# Patient Record
Sex: MALE | Race: BLACK OR AFRICAN AMERICAN | Employment: FULL TIME | URBAN - METROPOLITAN AREA
[De-identification: names, ages, dates, MRNs, and addresses within clinical notes are randomized per-mention and may not be internally consistent; named-entity substitution may affect disease eponyms.]

---

## 2019-06-09 ENCOUNTER — HOSPITAL ENCOUNTER (EMERGENCY)
Age: 39
Discharge: HOME | End: 2019-06-09
Attending: EMERGENCY MEDICINE

## 2019-06-09 VITALS
SYSTOLIC BLOOD PRESSURE: 120 MMHG | RESPIRATION RATE: 18 BRPM | BODY MASS INDEX: 23.52 KG/M2 | HEART RATE: 64 BPM | HEIGHT: 71 IN | TEMPERATURE: 98.4 F | WEIGHT: 168 LBS | DIASTOLIC BLOOD PRESSURE: 75 MMHG | OXYGEN SATURATION: 98 %

## 2019-06-09 DIAGNOSIS — H20.9 IRITIS: Primary | ICD-10-CM

## 2019-06-09 PROCEDURE — 6370000000 HC RX 637 (ALT 250 FOR IP): Performed by: EMERGENCY MEDICINE

## 2019-06-09 PROCEDURE — 99282 EMERGENCY DEPT VISIT SF MDM: CPT

## 2019-06-09 PROCEDURE — 6370000000 HC RX 637 (ALT 250 FOR IP): Performed by: PHYSICIAN ASSISTANT

## 2019-06-09 RX ORDER — ATROPINE SULFATE 10 MG/ML
1 SOLUTION/ DROPS OPHTHALMIC ONCE
Status: COMPLETED | OUTPATIENT
Start: 2019-06-09 | End: 2019-06-09

## 2019-06-09 RX ORDER — ERYTHROMYCIN 5 MG/G
OINTMENT OPHTHALMIC
Qty: 1 TUBE | Refills: 0 | Status: SHIPPED | OUTPATIENT
Start: 2019-06-09 | End: 2019-06-19

## 2019-06-09 RX ORDER — HOMATROPINE HYDROBROMIDE OPHTHALMIC 50 MG/ML
1 SOLUTION OPHTHALMIC ONCE
Status: DISCONTINUED | OUTPATIENT
Start: 2019-06-09 | End: 2019-06-09

## 2019-06-09 RX ORDER — PROPARACAINE HYDROCHLORIDE 5 MG/ML
1 SOLUTION/ DROPS OPHTHALMIC ONCE
Status: COMPLETED | OUTPATIENT
Start: 2019-06-09 | End: 2019-06-09

## 2019-06-09 RX ADMIN — PROPARACAINE HYDROCHLORIDE 1 DROP: 5 SOLUTION/ DROPS OPHTHALMIC at 18:42

## 2019-06-09 RX ADMIN — ATROPINE SULFATE 1 DROP: 10 SOLUTION/ DROPS OPHTHALMIC at 19:46

## 2019-06-09 RX ADMIN — FLUORESCEIN SODIUM 1 MG: 1 STRIP OPHTHALMIC at 18:42

## 2019-06-09 ASSESSMENT — ENCOUNTER SYMPTOMS
SHORTNESS OF BREATH: 0
EYE ITCHING: 0
NAUSEA: 0
COLOR CHANGE: 0
SORE THROAT: 0
EYE DISCHARGE: 1
VOMITING: 0
ABDOMINAL PAIN: 0
DIARRHEA: 0
COUGH: 0
BACK PAIN: 0
PHOTOPHOBIA: 1
EYE REDNESS: 1
EYE PAIN: 1

## 2019-06-09 ASSESSMENT — PAIN DESCRIPTION - LOCATION: LOCATION: EYE

## 2019-06-09 ASSESSMENT — VISUAL ACUITY
OS: 20/200
OD: 20/70
OU: 20/70

## 2019-06-09 ASSESSMENT — PAIN DESCRIPTION - FREQUENCY: FREQUENCY: CONTINUOUS

## 2019-06-09 ASSESSMENT — PAIN DESCRIPTION - DESCRIPTORS: DESCRIPTORS: BURNING;ACHING

## 2019-06-09 ASSESSMENT — PAIN DESCRIPTION - ORIENTATION: ORIENTATION: LEFT

## 2019-06-09 ASSESSMENT — PAIN DESCRIPTION - PAIN TYPE: TYPE: ACUTE PAIN

## 2019-06-09 ASSESSMENT — PAIN SCALES - GENERAL: PAINLEVEL_OUTOF10: 10

## 2019-06-09 NOTE — ED TRIAGE NOTES
\"Accidentally poked in the eye yesterday by my cousin, my left eye in hurting real bad, light affects it, it is red with continuous drainage, worst pain ever\"  Rtaed bursning and aching pain 10/10.

## 2019-06-09 NOTE — ED PROVIDER NOTES
[facility]  ED  eMERGENCY dEPARTMENT eNCOUnter      Pt Name: Mariano Chang  MRN: [id number]  [place] 1980  Date of evaluation: 6/9/2019  Provider: Angeline Mcnulty PA-C    CHIEF COMPLAINT       Chief Complaint   Patient presents with    Eye Injury     Eye redness RT injury     Photophobia             HISTORY OF PRESENT ILLNESS  (Location/Symptom, Timing/Onset, Context/Setting, Quality, Duration, Modifying Factors, Severity.)   Mariano Chang is a 42 y.o. male who presents to the emergencydepartment complaining of left eye redness and pain starting yesterday after he was playing with his cousin and he actually hit him in the left eye. He did have his contacts in and slept and then the night before so this could potentially contribute to his irritation. He denies any chest pain or shortness of breath. No fever or chills. No abdominal pain. He has no history of glaucoma or no family history of glaucoma. No other symptoms. REVIEW OF SYSTEMS    (2-9 systems for level 4, 10 ormore for level 5)     Review of Systems   Constitutional: Negative for chills, fatigue and fever. HENT: Negative for ear pain and sore throat. Eyes: Positive for photophobia, pain, discharge and redness. Negative for itching and visual disturbance. Respiratory: Negative for cough and shortness of breath. Cardiovascular: Negative for chest pain, palpitations and leg swelling. Gastrointestinal: Negative for abdominal pain, diarrhea, nausea and vomiting. Genitourinary: Negative for flank pain. Musculoskeletal: Negative for back pain, neck pain and neck stiffness. Skin: Negative for color change. Neurological: Negative for dizziness, tremors, seizures, syncope, facial asymmetry, speech difficulty, weakness, light-headedness, numbness and headaches. PAST MEDICAL HISTORY   History reviewed. No pertinent past medical history. Reviewed and not pertinent to today's chief complaint.   SURGICAL HISTORY days. He should return for any worsening symptoms. Visual acuity is normal. No diplopia. We will give him erythromycin ointment to go home with to take as prescribed and as directed and all the way through to completion. Patient understood and will comply. Patient is satisfied. All questions answered. Attending physician, myself, and patient agree no further workup is necessary at this time. Patient was given very strict return protocols and is completely agreeable with this plan. This patient was seen by the attending [phone] they agreed with the assessment and plan. CONSULTS:  None    PROCEDURES:  Procedures    FINAL IMPRESSION      1.  Iritis          DISPOSITION/PLAN   DISPOSITION Decision To Discharge 06/09/2019 07:21:41 PM      PATIENT REFERRED TO:  [facility]  [address] [id number] [phone]  Go to   As needed, If symptoms worsen    Dee Dee Salter MD  [address] [place] [address]  [phone]    Schedule an appointment as soon as possible for a visit in 3 days  For Re-check      DISCHARGE MEDICATIONS:  Discharge Medication List as of 6/9/2019  7:25 PM      START taking these medications    Details   erythromycin (ROMYCIN) 5 MG/GM ophthalmic ointment Apply to affected eye 3 times a day for 5-7 days until symptoms clear., Disp-1 Tube, R-0, Print             (Please note that portions of this note were completed with a voice recognition program.  Efforts were made to edit the dictations but occasionally words are mis-transcribed.)    [address] [facility], PAJaimeC       Izzy Keen PA-C  06/09/19 4814

## 2019-06-09 NOTE — LETTER
[facility]  [address] [facility] [address] [facility] [address]  Phone: [phone]             June 12, 2019    Patient: Raul Dandy   YOB: 1980   Date of Visit: 6/9/2019       To Whom It May Concern:    Dina Charlotte was seen and treated in our emergency department on 6/9/2019. Luisa Never       Sincerely,             Signature:__________________________________

## 2019-06-09 NOTE — ED NOTES
Visual acuity obtained, pt not wearing his normal eye glasses or contacts. Anjum CARRASCO notified.       Phillip Warren RN  06/09/19 1946

## 2021-01-19 ENCOUNTER — HOSPITAL ENCOUNTER (INPATIENT)
Age: 41
LOS: 2 days | Discharge: HOME | DRG: 605 | End: 2021-01-21
Attending: EMERGENCY MEDICINE | Admitting: INTERNAL MEDICINE

## 2021-01-19 DIAGNOSIS — S11.90XA OPEN WOUND OF NECK, INITIAL ENCOUNTER: Primary | ICD-10-CM

## 2021-01-19 PROBLEM — T14.8XXA OPEN WOUND OF SKIN: Status: ACTIVE | Noted: 2021-01-19

## 2021-01-19 LAB
AMPHETAMINE SCREEN URINE: POSITIVE
ANION GAP SERPL CALCULATED.3IONS-SCNC: 5 MMOL/L (ref 9–17)
BARBITURATE SCREEN URINE: NEGATIVE
BENZODIAZEPINE SCREEN, URINE: NEGATIVE
BILIRUBIN URINE: NEGATIVE
BUN BLDV-MCNC: 11 MG/DL (ref 6–20)
BUN/CREAT BLD: ABNORMAL (ref 9–20)
BUPRENORPHINE URINE: ABNORMAL
CALCIUM SERPL-MCNC: 8.5 MG/DL (ref 8.6–10.4)
CANNABINOID SCREEN URINE: POSITIVE
CHLORIDE BLD-SCNC: 113 MMOL/L (ref 98–107)
CO2: 22 MMOL/L (ref 20–31)
COCAINE METABOLITE, URINE: NEGATIVE
COLOR: YELLOW
COMMENT UA: NORMAL
CREAT SERPL-MCNC: 0.87 MG/DL (ref 0.7–1.2)
GFR AFRICAN AMERICAN: >60 ML/MIN
GFR NON-AFRICAN AMERICAN: >60 ML/MIN
GFR SERPL CREATININE-BSD FRML MDRD: ABNORMAL ML/MIN/{1.73_M2}
GFR SERPL CREATININE-BSD FRML MDRD: ABNORMAL ML/MIN/{1.73_M2}
GLUCOSE BLD-MCNC: 92 MG/DL (ref 70–99)
GLUCOSE URINE: NEGATIVE
HCT VFR BLD CALC: 42.9 % (ref 40.7–50.3)
HEMOGLOBIN: 13.9 G/DL (ref 13–17)
KETONES, URINE: NEGATIVE
LEUKOCYTE ESTERASE, URINE: NEGATIVE
MCH RBC QN AUTO: 30.3 PG (ref 25.2–33.5)
MCHC RBC AUTO-ENTMCNC: 32.4 G/DL (ref 28.4–34.8)
MCV RBC AUTO: 93.7 FL (ref 82.6–102.9)
MDMA URINE: ABNORMAL
METHADONE SCREEN, URINE: NEGATIVE
METHAMPHETAMINE, URINE: ABNORMAL
NITRITE, URINE: NEGATIVE
NRBC AUTOMATED: 0 PER 100 WBC
OPIATES, URINE: NEGATIVE
OXYCODONE SCREEN URINE: NEGATIVE
PDW BLD-RTO: 13.8 % (ref 11.8–14.4)
PH UA: 7 (ref 5–8)
PHENCYCLIDINE, URINE: NEGATIVE
PLATELET # BLD: 213 K/UL (ref 138–453)
PMV BLD AUTO: 10.6 FL (ref 8.1–13.5)
POTASSIUM SERPL-SCNC: 4.5 MMOL/L (ref 3.7–5.3)
PROPOXYPHENE, URINE: ABNORMAL
PROTEIN UA: NEGATIVE
RBC # BLD: 4.58 M/UL (ref 4.21–5.77)
SODIUM BLD-SCNC: 140 MMOL/L (ref 135–144)
SPECIFIC GRAVITY UA: 1.03 (ref 1–1.03)
TEST INFORMATION: ABNORMAL
TRICYCLIC ANTIDEPRESSANTS, UR: ABNORMAL
TURBIDITY: CLEAR
URINE HGB: NEGATIVE
UROBILINOGEN, URINE: NORMAL
WBC # BLD: 6.5 K/UL (ref 3.5–11.3)

## 2021-01-19 PROCEDURE — 1200000000 HC SEMI PRIVATE

## 2021-01-19 PROCEDURE — 81003 URINALYSIS AUTO W/O SCOPE: CPT

## 2021-01-19 PROCEDURE — 87070 CULTURE OTHR SPECIMN AEROBIC: CPT

## 2021-01-19 PROCEDURE — 99285 EMERGENCY DEPT VISIT HI MDM: CPT

## 2021-01-19 PROCEDURE — 6370000000 HC RX 637 (ALT 250 FOR IP): Performed by: GENERAL PRACTICE

## 2021-01-19 PROCEDURE — 86403 PARTICLE AGGLUT ANTBDY SCRN: CPT

## 2021-01-19 PROCEDURE — 85027 COMPLETE CBC AUTOMATED: CPT

## 2021-01-19 PROCEDURE — 80048 BASIC METABOLIC PNL TOTAL CA: CPT

## 2021-01-19 PROCEDURE — 87075 CULTR BACTERIA EXCEPT BLOOD: CPT

## 2021-01-19 PROCEDURE — 80307 DRUG TEST PRSMV CHEM ANLYZR: CPT

## 2021-01-19 PROCEDURE — 87205 SMEAR GRAM STAIN: CPT

## 2021-01-19 RX ORDER — DOXYCYCLINE HYCLATE 100 MG
100 TABLET ORAL EVERY 12 HOURS SCHEDULED
Status: DISCONTINUED | OUTPATIENT
Start: 2021-01-19 | End: 2021-01-21 | Stop reason: HOSPADM

## 2021-01-19 RX ADMIN — DOXYCYCLINE HYCLATE 100 MG: 100 TABLET, COATED ORAL at 21:43

## 2021-01-19 ASSESSMENT — ENCOUNTER SYMPTOMS
COUGH: 0
SORE THROAT: 0
FACIAL SWELLING: 1
SHORTNESS OF BREATH: 0
TROUBLE SWALLOWING: 1
VOMITING: 0
NAUSEA: 0
VOICE CHANGE: 0

## 2021-01-19 ASSESSMENT — PAIN DESCRIPTION - LOCATION: LOCATION: JAW

## 2021-01-19 ASSESSMENT — PAIN SCALES - GENERAL: PAINLEVEL_OUTOF10: 8

## 2021-01-19 ASSESSMENT — PAIN DESCRIPTION - ORIENTATION: ORIENTATION: LEFT

## 2021-01-19 NOTE — ED NOTES
Pt arrived to ED alert and oriented x4 via [facility]. Pt is a transfer from [facility] for infection to left jaw. Pt reports that he had an ingrown hair and states that he was unable to get it out, states that since Friday he has been digging in the wound with his fingers and tweezers. Pt reports that he noticed worms coming from it and states that he has felt more weak and fatigued than usual.  Pt c/o left jaw pain, states that he has not been able to eat d/t difficulty of eating and drinking from wound. Pt denies having been around anyone suspected to have COVID-19 or anyone that has been sick, denies recent travel outside the [facility] or [address]. Pt placed on continuous pulse ox and BP cuff. Dr. Leida Verdugo at bedside to assess pt. RR even and unlabored. NAD noted. Will continue with plan of care.      David Mixon RN  01/19/21 8149

## 2021-01-19 NOTE — ED PROVIDER NOTES
[facility]     Emergency Department     Faculty Attestation    I performed a history and physical examination of the patient and discussed management with the resident. I reviewed the residents note and agree with the documented findings and plan of care. Any areas of disagreement are noted on the chart. I was personally present for the key portions of any procedures. I have documented in the chart those procedures where I was not present during the key portions. I have reviewed the emergency nurses triage note. I agree with the chief complaint, past medical history, past surgical history, allergies, medications, social and family history as documented unless otherwise noted below. For Physician Assistant/ Nurse Practitioner cases/documentation I have personally evaluated this patient and have completed at least one if not all key elements of the E/M (history, physical exam, and MDM). Additional findings are as noted. I have personally seen and evaluated the patient. I find the patient's history and physical exam are consistent with the NP/PA documentation. I agree with the care provided, treatment rendered, disposition and follow-up plan. For evaluation of large ulcerative lesion self-induced over the last several days. Critical Care     Tj Robbins M.D.   Attending Emergency  Physician              Devang Corbett MD  01/19/21 4570

## 2021-01-19 NOTE — ED PROVIDER NOTES
[address] [facility]  ED  Emergency Department Encounter  EmergencyMedicine Resident     Pt Diego Batista  MRN: [id number]  [place] 1980  Date of evaluation: 1/19/21  PCP:  No primary care provider on file. CHIEF COMPLAINT       Chief Complaint   Patient presents with    Jaw Pain     Left jaw pain, has been digging in left jaw area since Friday    Wound Infection       HISTORY OF PRESENT ILLNESS  (Location/Symptom, Timing/Onset, Context/Setting, Quality, Duration, Modifying Factors, Severity.)      Maximo Beck is a 47 y.o. male who presents as a transfer from [facility] for \"worms under his skin\" patient states that he has a history of ingrown hairs on his face, and normally just picks them out with a tweezer. Patient states that on Friday he had the same feeling however when he tried to pull the hair out he and his girlfriend were convinced that it was a worm and they continued to gag since Friday into his neck developing a large open area in his neck. Emergency department at [facility] discussed with on-call plastic surgeon Dr. Alexa Torres requested that patient be transferred to [facility] for admission to the medicine team infectious disease consult. Patient states that he did have drainage from the wound, but denies any immediate expulsion or large purulent drainage on Friday, states that there is been constant intermittent drainage from the wound. Patient states he has had some mild dysphagia patient denies any significant past medical history, is not on any medications, denies any medical allergies. Denies any history of depression or psychiatric diagnosis. PAST MEDICAL / SURGICAL / SOCIAL / FAMILY HISTORY      has no past medical history on file. None     has no past surgical history on file.   None    Social History     Socioeconomic History    Marital status: Single     Spouse name: Not on file    Number of children: Not on file    Years of education: Not on file    Highest education level: Not on file   Occupational History    Not on file   Social Needs    Financial resource strain: Not on file    Food insecurity     Worry: Not on file     Inability: Not on file    Transportation needs     Medical: Not on file     Non-medical: Not on file   Tobacco Use    Smoking status: Current Every Day Smoker     Packs/day: 0.50    Smokeless tobacco: Never Used   Substance and Sexual Activity    Alcohol use: Yes     Comment: Socially    Drug use: Never    Sexual activity: Not on file   Lifestyle    Physical activity     Days per week: Not on file     Minutes per session: Not on file    Stress: Not on file   Relationships    Social connections     Talks on phone: Not on file     Gets together: Not on file     Attends Yazidism service: Not on file     Active member of club or organization: Not on file     Attends meetings of clubs or organizations: Not on file     Relationship status: Not on file    Intimate partner violence     Fear of current or ex partner: Not on file     Emotionally abused: Not on file     Physically abused: Not on file     Forced sexual activity: Not on file   Other Topics Concern    Not on file   Social History Narrative    Not on file       History reviewed. No pertinent family history. Allergies:  Patient has no known allergies. Home Medications:  Prior to Admission medications    Not on File       REVIEW OF SYSTEMS    (2-9 systems for level 4, 10 or more for level 5)      Review of Systems   Constitutional: Negative for activity change, appetite change, chills and fever. HENT: Positive for facial swelling and trouble swallowing. Negative for congestion, sore throat and voice change. Respiratory: Negative for cough and shortness of breath. Cardiovascular: Negative for chest pain. Gastrointestinal: Negative for nausea and vomiting. Endocrine: Negative for cold intolerance and heat intolerance. Musculoskeletal: Positive for neck pain.    Skin: Positive for wound. Neurological: Negative for light-headedness and headaches. Psychiatric/Behavioral: Negative for agitation, behavioral problems, confusion and self-injury. The patient is not nervous/anxious. PHYSICAL EXAM   (up to 7 for level 4, 8 or more for level 5)      INITIAL VITALS:   BP (!) 144/88   Pulse 61   Temp 99.3 °F (37.4 °C) (Oral)   Resp 16   Ht 5' 11\" (1.803 m)   Wt 170 lb (77.1 kg)   SpO2 99%   BMI 23.71 kg/m²     Physical Exam  Constitutional:       General: He is not in acute distress. Appearance: Normal appearance. He is not ill-appearing, toxic-appearing or diaphoretic. HENT:      Head: Normocephalic and atraumatic. Mouth/Throat:      Mouth: Mucous membranes are moist.      Pharynx: No oropharyngeal exudate or posterior oropharyngeal erythema. Eyes:      General:         Right eye: No discharge. Left eye: No discharge. Pupils: Pupils are equal, round, and reactive to light. Neck:      Comments: Open wound and left jawline and to submandibular area and neck with large pocket noticeable. No active bleeding or purulent drainage noted  Cardiovascular:      Rate and Rhythm: Normal rate. Pulmonary:      Comments: Breathing comfortable in room air, symmetric chest rise, speaking full sentences no evidence of respiratory distress with audible stridor  Neurological:      Mental Status: He is alert. Psychiatric:         Mood and Affect: Mood normal.         Behavior: Behavior normal.         Thought Content:  Thought content normal.         Judgment: Judgment normal.      Comments: Patient is calm, answers questions appropriately, normal affect             DIFFERENTIAL  DIAGNOSIS     PLAN (LABS / IMAGING / EKG):  Orders Placed This Encounter   Procedures    Culture, Anaerobic and Aerobic    CBC    BASIC METABOLIC PANEL    Urinalysis    Urine Drug Screen    Inpatient consult to Plastic Surgery    Inpatient consult to Internal Medicine    Inpatient consult to Infectious Diseases    PATIENT STATUS (FROM ED OR OR/PROCEDURAL) Inpatient       MEDICATIONS ORDERED:  Orders Placed This Encounter   Medications    doxycycline hyclate (VIBRA-TABS) tablet 100 mg     Order Specific Question:   Antimicrobial Indications     Answer:   Skin and Soft Tissue Infection       DDX: Abscess, sebaceous cyst, folliculitis, furuncle, carbuncle, formication, bipolar, wu episode, schizophrenia    DIAGNOSTIC RESULTS / EMERGENCY DEPARTMENT COURSE / MDM   :  Results for orders placed or performed during the hospital encounter of 01/19/21   CBC   Result Value Ref Range    WBC 6.5 3.5 - 11.3 k/uL    RBC 4.58 4.21 - 5.77 m/uL    Hemoglobin 13.9 13.0 - 17.0 g/dL    Hematocrit 42.9 40.7 - 50.3 %    MCV 93.7 82.6 - 102.9 fL    MCH 30.3 25.2 - 33.5 pg    MCHC 32.4 28.4 - 34.8 g/dL    RDW 13.8 11.8 - 14.4 %    Platelets 240 597 - 423 k/uL    MPV 10.6 8.1 - 13.5 fL    NRBC Automated 0.0 0.0 per 100 WBC   BASIC METABOLIC PANEL   Result Value Ref Range    Glucose 92 70 - 99 mg/dL    BUN 11 6 - 20 mg/dL    CREATININE 0.87 0.70 - 1.20 mg/dL    Bun/Cre Ratio NOT REPORTED 9 - 20    Calcium 8.5 (L) 8.6 - 10.4 mg/dL    Sodium 140 135 - 144 mmol/L    Potassium 4.5 3.7 - 5.3 mmol/L    Chloride 113 (H) 98 - 107 mmol/L    CO2 22 20 - 31 mmol/L    Anion Gap 5 (L) 9 - 17 mmol/L    GFR Non-African American >60 >60 mL/min    GFR African American >60 >60 mL/min    GFR Comment          GFR Staging NOT REPORTED              RADIOLOGY:  None    EKG  None    All EKG's are interpreted by the Emergency Department Physician who either signs or Co-signs this chart in the absence of a cardiologist.    EMERGENCY DEPARTMENT COURSE/IMPRESSION: 24-year-old male transferred from outside hospital for open wound to left side of neck after having feeling that worms are in his neck and tried to take them out with tweezers.   Upon arrival to the emergency department patient denies any pain, is alert and oriented x3 is

## 2021-01-19 NOTE — ED NOTES
Bed: 27  Expected date: 1/19/21  Expected time: 6:11 PM  Means of arrival:   Comments:  [facility]   Patient was digging at his face/neck area overlying his left mandible, with a concern for worms, he has multiple open wounds with obvious infection. Dr. Adrianna Watson on-call for facial surgery/plastics, accepted patient and recommend admission to medical team, IV antibiotics, ID consultation.      Nadia Acuña RN  01/19/21 [id number]

## 2021-01-20 ENCOUNTER — APPOINTMENT (OUTPATIENT)
Dept: CT IMAGING | Age: 41
DRG: 605 | End: 2021-01-20

## 2021-01-20 LAB
ABSOLUTE EOS #: 0.17 K/UL (ref 0–0.44)
ABSOLUTE IMMATURE GRANULOCYTE: <0.03 K/UL (ref 0–0.3)
ABSOLUTE LYMPH #: 2.5 K/UL (ref 1.1–3.7)
ABSOLUTE MONO #: 0.69 K/UL (ref 0.1–1.2)
ANION GAP SERPL CALCULATED.3IONS-SCNC: 9 MMOL/L (ref 9–17)
BASOPHILS # BLD: 1 % (ref 0–2)
BASOPHILS ABSOLUTE: 0.05 K/UL (ref 0–0.2)
BUN BLDV-MCNC: 10 MG/DL (ref 6–20)
BUN/CREAT BLD: ABNORMAL (ref 9–20)
C-REACTIVE PROTEIN: 13.7 MG/L (ref 0–5)
CALCIUM SERPL-MCNC: 8.7 MG/DL (ref 8.6–10.4)
CHLORIDE BLD-SCNC: 110 MMOL/L (ref 98–107)
CO2: 22 MMOL/L (ref 20–31)
CREAT SERPL-MCNC: 0.94 MG/DL (ref 0.7–1.2)
DIFFERENTIAL TYPE: NORMAL
EOSINOPHILS RELATIVE PERCENT: 3 % (ref 1–4)
ESTIMATED AVERAGE GLUCOSE: 94 MG/DL
GFR AFRICAN AMERICAN: >60 ML/MIN
GFR NON-AFRICAN AMERICAN: >60 ML/MIN
GFR SERPL CREATININE-BSD FRML MDRD: ABNORMAL ML/MIN/{1.73_M2}
GFR SERPL CREATININE-BSD FRML MDRD: ABNORMAL ML/MIN/{1.73_M2}
GLUCOSE BLD-MCNC: 95 MG/DL (ref 70–99)
HBA1C MFR BLD: 4.9 % (ref 4–6)
HCT VFR BLD CALC: 45.1 % (ref 40.7–50.3)
HEMOGLOBIN: 14.8 G/DL (ref 13–17)
HIV AG/AB: NONREACTIVE
IMMATURE GRANULOCYTES: 0 %
LYMPHOCYTES # BLD: 43 % (ref 24–43)
MCH RBC QN AUTO: 30.5 PG (ref 25.2–33.5)
MCHC RBC AUTO-ENTMCNC: 32.8 G/DL (ref 28.4–34.8)
MCV RBC AUTO: 92.8 FL (ref 82.6–102.9)
MONOCYTES # BLD: 12 % (ref 3–12)
NRBC AUTOMATED: 0 PER 100 WBC
PDW BLD-RTO: 13.5 % (ref 11.8–14.4)
PLATELET # BLD: NORMAL K/UL (ref 138–453)
PLATELET ESTIMATE: NORMAL
PLATELET, FLUORESCENCE: NORMAL K/UL (ref 138–453)
PLATELET, IMMATURE FRACTION: NORMAL % (ref 1.1–10.3)
PMV BLD AUTO: NORMAL FL (ref 8.1–13.5)
POTASSIUM SERPL-SCNC: 4.6 MMOL/L (ref 3.7–5.3)
RBC # BLD: 4.86 M/UL (ref 4.21–5.77)
RBC # BLD: NORMAL 10*6/UL
SEG NEUTROPHILS: 41 % (ref 36–65)
SEGMENTED NEUTROPHILS ABSOLUTE COUNT: 2.42 K/UL (ref 1.5–8.1)
SODIUM BLD-SCNC: 141 MMOL/L (ref 135–144)
THYROXINE, FREE: 1.35 NG/DL (ref 0.93–1.7)
TSH SERPL DL<=0.05 MIU/L-ACNC: 1.9 MIU/L (ref 0.3–5)
WBC # BLD: 5.9 K/UL (ref 3.5–11.3)
WBC # BLD: NORMAL 10*3/UL

## 2021-01-20 PROCEDURE — 1200000000 HC SEMI PRIVATE

## 2021-01-20 PROCEDURE — 86140 C-REACTIVE PROTEIN: CPT

## 2021-01-20 PROCEDURE — 88305 TISSUE EXAM BY PATHOLOGIST: CPT

## 2021-01-20 PROCEDURE — 94760 N-INVAS EAR/PLS OXIMETRY 1: CPT

## 2021-01-20 PROCEDURE — 6370000000 HC RX 637 (ALT 250 FOR IP): Performed by: STUDENT IN AN ORGANIZED HEALTH CARE EDUCATION/TRAINING PROGRAM

## 2021-01-20 PROCEDURE — 2580000003 HC RX 258: Performed by: STUDENT IN AN ORGANIZED HEALTH CARE EDUCATION/TRAINING PROGRAM

## 2021-01-20 PROCEDURE — 99254 IP/OBS CNSLTJ NEW/EST MOD 60: CPT | Performed by: INTERNAL MEDICINE

## 2021-01-20 PROCEDURE — 83036 HEMOGLOBIN GLYCOSYLATED A1C: CPT

## 2021-01-20 PROCEDURE — 85055 RETICULATED PLATELET ASSAY: CPT

## 2021-01-20 PROCEDURE — 87389 HIV-1 AG W/HIV-1&-2 AB AG IA: CPT

## 2021-01-20 PROCEDURE — 0HB1XZX EXCISION OF FACE SKIN, EXTERNAL APPROACH, DIAGNOSTIC: ICD-10-PCS | Performed by: PLASTIC SURGERY

## 2021-01-20 PROCEDURE — 85025 COMPLETE CBC W/AUTO DIFF WBC: CPT

## 2021-01-20 PROCEDURE — 70491 CT SOFT TISSUE NECK W/DYE: CPT

## 2021-01-20 PROCEDURE — 80048 BASIC METABOLIC PNL TOTAL CA: CPT

## 2021-01-20 PROCEDURE — 6360000004 HC RX CONTRAST MEDICATION: Performed by: PLASTIC SURGERY

## 2021-01-20 PROCEDURE — 84443 ASSAY THYROID STIM HORMONE: CPT

## 2021-01-20 PROCEDURE — 84439 ASSAY OF FREE THYROXINE: CPT

## 2021-01-20 PROCEDURE — 36415 COLL VENOUS BLD VENIPUNCTURE: CPT

## 2021-01-20 PROCEDURE — 99222 1ST HOSP IP/OBS MODERATE 55: CPT | Performed by: INTERNAL MEDICINE

## 2021-01-20 RX ORDER — POLYETHYLENE GLYCOL 3350 17 G/17G
17 POWDER, FOR SOLUTION ORAL DAILY PRN
Status: DISCONTINUED | OUTPATIENT
Start: 2021-01-20 | End: 2021-01-21 | Stop reason: HOSPADM

## 2021-01-20 RX ORDER — ACETAMINOPHEN 650 MG/1
650 SUPPOSITORY RECTAL EVERY 6 HOURS PRN
Status: DISCONTINUED | OUTPATIENT
Start: 2021-01-20 | End: 2021-01-21 | Stop reason: HOSPADM

## 2021-01-20 RX ORDER — LANOLIN ALCOHOL/MO/W.PET/CERES
3 CREAM (GRAM) TOPICAL NIGHTLY PRN
Status: DISCONTINUED | OUTPATIENT
Start: 2021-01-19 | End: 2021-01-21 | Stop reason: HOSPADM

## 2021-01-20 RX ORDER — OXYCODONE HYDROCHLORIDE AND ACETAMINOPHEN 5; 325 MG/1; MG/1
1 TABLET ORAL EVERY 12 HOURS
Status: COMPLETED | OUTPATIENT
Start: 2021-01-20 | End: 2021-01-21

## 2021-01-20 RX ORDER — IBUPROFEN 400 MG/1
400 TABLET ORAL EVERY 6 HOURS PRN
Status: DISCONTINUED | OUTPATIENT
Start: 2021-01-20 | End: 2021-01-21 | Stop reason: HOSPADM

## 2021-01-20 RX ORDER — SODIUM CHLORIDE 0.9 % (FLUSH) 0.9 %
10 SYRINGE (ML) INJECTION PRN
Status: DISCONTINUED | OUTPATIENT
Start: 2021-01-20 | End: 2021-01-21 | Stop reason: HOSPADM

## 2021-01-20 RX ORDER — ACETAMINOPHEN 325 MG/1
650 TABLET ORAL EVERY 6 HOURS PRN
Status: DISCONTINUED | OUTPATIENT
Start: 2021-01-20 | End: 2021-01-21 | Stop reason: HOSPADM

## 2021-01-20 RX ORDER — SODIUM CHLORIDE 0.9 % (FLUSH) 0.9 %
10 SYRINGE (ML) INJECTION EVERY 12 HOURS SCHEDULED
Status: DISCONTINUED | OUTPATIENT
Start: 2021-01-20 | End: 2021-01-21 | Stop reason: HOSPADM

## 2021-01-20 RX ORDER — AMOXICILLIN AND CLAVULANATE POTASSIUM 875; 125 MG/1; MG/1
1 TABLET, FILM COATED ORAL EVERY 12 HOURS SCHEDULED
Status: DISCONTINUED | OUTPATIENT
Start: 2021-01-20 | End: 2021-01-21 | Stop reason: HOSPADM

## 2021-01-20 RX ORDER — ONDANSETRON 2 MG/ML
4 INJECTION INTRAMUSCULAR; INTRAVENOUS EVERY 6 HOURS PRN
Status: DISCONTINUED | OUTPATIENT
Start: 2021-01-20 | End: 2021-01-21 | Stop reason: HOSPADM

## 2021-01-20 RX ORDER — PROMETHAZINE HYDROCHLORIDE 12.5 MG/1
12.5 TABLET ORAL EVERY 6 HOURS PRN
Status: DISCONTINUED | OUTPATIENT
Start: 2021-01-20 | End: 2021-01-21 | Stop reason: HOSPADM

## 2021-01-20 RX ADMIN — DOXYCYCLINE HYCLATE 100 MG: 100 TABLET, COATED ORAL at 20:49

## 2021-01-20 RX ADMIN — IOPAMIDOL 75 ML: 755 INJECTION, SOLUTION INTRAVENOUS at 15:20

## 2021-01-20 RX ADMIN — DOXYCYCLINE HYCLATE 100 MG: 100 TABLET, COATED ORAL at 09:44

## 2021-01-20 RX ADMIN — ACETAMINOPHEN 650 MG: 325 TABLET ORAL at 09:44

## 2021-01-20 RX ADMIN — SODIUM CHLORIDE, PRESERVATIVE FREE 10 ML: 5 INJECTION INTRAVENOUS at 20:49

## 2021-01-20 RX ADMIN — AMOXICILLIN AND CLAVULANATE POTASSIUM 1 TABLET: 875; 125 TABLET, FILM COATED ORAL at 13:00

## 2021-01-20 RX ADMIN — OXYCODONE HYDROCHLORIDE AND ACETAMINOPHEN 1 TABLET: 5; 325 TABLET ORAL at 15:42

## 2021-01-20 ASSESSMENT — ENCOUNTER SYMPTOMS
NAUSEA: 0
EYE REDNESS: 0
APNEA: 0
DIARRHEA: 0
VOMITING: 0
COLOR CHANGE: 0
EYE DISCHARGE: 0
ABDOMINAL DISTENTION: 0
SHORTNESS OF BREATH: 0
SORE THROAT: 0
CONSTIPATION: 0
STRIDOR: 0
COUGH: 0
WHEEZING: 0
PHOTOPHOBIA: 0

## 2021-01-20 ASSESSMENT — PAIN SCALES - GENERAL
PAINLEVEL_OUTOF10: 7
PAINLEVEL_OUTOF10: 9

## 2021-01-20 NOTE — PROGRESS NOTES
Physical Therapy  DATE: 2021    NAME: Dustin Barrera  MRN: [id number]   : 1980    Patient not seen this date for Physical Therapy due to:  [] Blood transfusion in progress  [] Hemodialysis  [] Patient Declined  [] Spine Precautions   [] Strict Bedrest  [] Surgery/ Procedure  [] Testing      [] Other        [x] PT is being discontinued at this time. Patient independent. No further needs. Spoke with pt and rn, both in agreement. Educated pt on requesting therapy if needs change this admission. [] PT is being discontinued at this time due to declining physical/ medical status. Therapy is not appropriate at this time. Cloteal Mends   Evaluation/treatment performed by Student PT under the supervision of co-signing PT who agrees with all evaluation/treatment and documentation.

## 2021-01-20 NOTE — PROGRESS NOTES
Occupational [address]  Occupational Therapy Not Seen Note    DATE: 2021  Name: Lucas Flowers  : 1980  MRN: [id number]    Patient not available for Occupational Therapy due to:        [] Pt independent with functional mobility and functional tasks. Pt has been completing ADLs and functional mobility around room during acute hospitalization. Pt with no OT acute care needs at this time, will defer OT eval. Pt educated to report to RN/MD if functional changes occur. Pt verbalized a good understanding.        Next Scheduled Treatment: Pt independent-Defer Eval     Stephanie Tyler OTR/L

## 2021-01-20 NOTE — CONSULTS
[facility] -   Infectious diseases evaluation  admission date 1/19/2021    reason for consultation:   wound    Impression :   Current:  · Facial infected wound    Recommendations   · Await wound culture  · Will need wound care  · ? also need to explore the area to make sure there is no tumor on the basis of such a wound  · Continue Doxycycline for now  · po Augmentin (started 1/20)   · Follow TSH and Free T4  · Will follow     Infection Control Recommendations   · [place] Precautions    Antimicrobial Stewardship Recommendations   · Simplification of therapy  · Targeted therapy      Coordination ofOutpatient Care:   · Estimated Length of IV antimicrobials:  · Patient will need Midline / picc Catheter Insertion:   · Patient will need SNF:  · Patient will need outpatient wound care:     History of Present Illness:   Initial history:  Maximo Beck is a 47y.o.-year-old male   Presents with swelling of the left neck by the jaw, along with a deep wound in that area. He was transferred from [facility], initially had a ingrown hair in that area but he was picking on it with tweezers on a regular basis thinking there are parasites inside. Ultimately developed this deep wound. His urine tested positive for cannabinoids and amphetamine.   Denies any fever abdominal pain diarrhea  On exam the wound is deep, it has some drainage, but does not have any cellulitis surrounding it    Patient admitted, started on doxycycline  Plastic consulted  Culture taken    WBC, creatinine normal        Interval changes  1/20/2021   Patient Vitals for the past 8 hrs:   BP Temp Temp src Pulse Resp SpO2   01/20/21 0829 120/82 98.6 °F (37 °C) Oral 53 16 100 %   01/20/21 0732 116/82 -- -- 62 14 95 %     No acute episodes overnigiht  Patient is heme stable and febrile  Denies any psychosis or manic concerns   Denies any use of amphetamine although positive on urine toxicology  Wound care on board  TSH and Free T4 pending  Augmentin added to Doxycycline    Summary of relevant labs:  Labs:  WBC 5.9  Hb 14.8  CRP 13.7  Cr 0/94  Urine toxicology positive for Marijuana and Amphetamines    Micro:  Anaerobic/Aerobic culture showing few neutrophils  Culture and sensitivity pending     Imaging:      I have personally reviewed the past medical history, past surgical history, medications, social history, and family history, and I haveupdated the database accordingly. Allergies:   Patient has no known allergies. Review of Systems:     Review of Systems   Constitutional: Negative for activity change, appetite change, diaphoresis, fatigue and fever. HENT: Negative for congestion, sneezing, sore throat and tinnitus. Eyes: Negative for photophobia, discharge, redness and visual disturbance. Respiratory: Negative for apnea, cough, shortness of breath, wheezing and stridor. Cardiovascular: Negative for chest pain. Gastrointestinal: Negative for abdominal distention, constipation, diarrhea, nausea and vomiting. Endocrine: Negative for cold intolerance, polydipsia, polyphagia and polyuria. Genitourinary: Negative for dysuria, hematuria and testicular pain. Musculoskeletal: Negative for arthralgias, myalgias, neck pain and neck stiffness. Skin: Positive for wound. Negative for color change and pallor. Allergic/Immunologic: Negative for immunocompromised state. Neurological: Negative for dizziness, tremors, syncope, weakness, light-headedness and numbness. Hematological: Negative for adenopathy. Psychiatric/Behavioral: Negative for agitation, confusion, hallucinations, self-injury and sleep disturbance. Physical Examination :     Physical Exam  Constitutional:       Appearance: Normal appearance. He is not ill-appearing. HENT:      Head: Normocephalic and atraumatic. Nose: Nose normal. No congestion or rhinorrhea.       Mouth/Throat:      Mouth: Mucous membranes are moist.      Pharynx: No oropharyngeal exudate or posterior oropharyngeal erythema. Eyes:      General: No scleral icterus. Pupils: Pupils are equal, round, and reactive to light. Neck:      Musculoskeletal: Neck supple. No neck rigidity. Vascular: No carotid bruit. Cardiovascular:      Rate and Rhythm: Normal rate and regular rhythm. Heart sounds: No murmur. No friction rub. No gallop. Pulmonary:      Effort: No respiratory distress. Breath sounds: No stridor. No wheezing or rhonchi. Abdominal:      General: There is no distension. Palpations: Abdomen is soft. There is no mass. Tenderness: There is no abdominal tenderness. There is no guarding or rebound. Hernia: No hernia is present. Genitourinary:     Comments: No marsh  Musculoskeletal:         General: No swelling, tenderness, deformity or signs of injury. Lymphadenopathy:      Cervical: No cervical adenopathy. Skin:     General: Skin is dry. Coloration: Skin is not jaundiced. Neurological:      General: No focal deficit present. Mental Status: He is alert and oriented to person, place, and time. Psychiatric:         Mood and Affect: Mood normal.         Behavior: Behavior normal.         Thought Content: Thought content normal.         Judgment: Judgment normal.       Past Medical History:   History reviewed. No pertinent past medical history. Past Surgical  History:   History reviewed. No pertinent surgical history.     Medications:      sodium chloride flush  10 mL Intravenous 2 times per day    enoxaparin  40 mg Subcutaneous Daily    amoxicillin-clavulanate  1 tablet Oral 2 times per day    doxycycline hyclate  100 mg Oral 2 times per day     Social History:     Social History     Socioeconomic History    Marital status: Single     Spouse name: Not on file    Number of children: Not on file    Years of education: Not on file    Highest education level: Not on file   Occupational History    Not on file Social Needs    Financial resource strain: Not on file    Food insecurity     Worry: Not on file     Inability: Not on file    Transportation needs     Medical: Not on file     Non-medical: Not on file   Tobacco Use    Smoking status: Current Every Day Smoker     Packs/day: 0.50    Smokeless tobacco: Never Used   Substance and Sexual Activity    Alcohol use: Yes     Comment: Socially    Drug use: Never    Sexual activity: Not on file   Lifestyle    Physical activity     Days per week: Not on file     Minutes per session: Not on file    Stress: Not on file   Relationships    Social connections     Talks on phone: Not on file     Gets together: Not on file     Attends Rastafari service: Not on file     Active member of club or organization: Not on file     Attends meetings of clubs or organizations: Not on file     Relationship status: Not on file    Intimate partner violence     Fear of current or ex partner: Not on file     Emotionally abused: Not on file     Physically abused: Not on file     Forced sexual activity: Not on file   Other Topics Concern    Not on file   Social History Narrative    Not on file     Family History:   History reviewed. No pertinent family history. Medical Decision Making:   I have independently reviewed/ordered the following labs:    CBC with Differential:   Recent Labs     01/19/21  1901 01/20/21  0559   WBC 6.5 5.9   HGB 13.9 14.8   HCT 42.9 45.1    See Reflexed IPF Result   LYMPHOPCT  --  43   MONOPCT  --  12     BMP:  Recent Labs     01/19/21  1901 01/20/21  0559    141   K 4.5 4.6   * 110*   CO2 22 22   BUN 11 10   CREATININE 0.87 0.94     Lab Results   Component Value Date    CREATININE 0.94 01/20/2021    GLUCOSE 95 01/20/2021     Thank you for allowing us to participate in the care of this patient. Please call with questions.     This note is created with the assistance of a speech recognition program.  While intending to generate adocument that actually reflects the content of the visit, the document can still have some errors including those of syntax and sound a like substitutions which may escape proof reading. It such instances, actual meaningcan be extrapolated by contextual diversion. Jose Ramon Amador MD  PGY-1, Internal Medicine Resident  [facility]  1/20/2021 1:26 PM        I have discussed the care of the patient, including pertinent history and exam findings,  with the resident. I have seen and examined the patient and the key elements of all parts of the encounter have been performed by me. I agree with the assessment, plan and orders as documented by the resident.     Marci Staton, Infectious Diseases

## 2021-01-20 NOTE — PROGRESS NOTES
Patient 40M with no significant PMH presented with welf induced open wound in L lower jaw. Pt reports 4d prior he had noticed a bumpw hich he had thought was an ingrown hair which occurs often per patient. He attempted removal with tweezers, but noticed it appeared larger and more indurated than prior. He instructed his girlfriend to continue digging with tweezers over the next few days and he reports digging out long white objects which he reports were bugs, indicating one of them was moving. Report wound was irrigated with alcohol. Denies any significant pain to the area. Denies fevers, chills, myalgias, but did report some fatigue. Denies drug use or other psychiatric diagnosis. Is not taking any medications at home. No medical hx including diabetes although he has a family history. In the ED, wound was observed and packed. No active drainage seen. Wound cultures obtained. Infectious disease consulted, started on doxycycline. Plastic surgery consulted, planing for bedisde biopsy in the morning. Wound packed. Admitted to medicine for open wound and soft tissue infection.      Open wound and soft tissue infection: self induced, no apparent drug or psych involvement, questionable parasitic worm infection, consult plastic surgery for wound, ID consulted for infection, continue doxycycline, wound care consult  HTN: continue to monitor at this time

## 2021-01-20 NOTE — CARE COORDINATION
Case Management Initial Discharge Plan  Heather Brown,             Met with:patient to discuss discharge plans. Information verified: address, contacts, phone number, , insurance Yes    Emergency Contact/Next of Kin name & number:   Dru Car   No    Other    [phone]         PCP: list given  Date of last visit:     Insurance Provider: Pending medicaid    Discharge Planning    Living Arrangements:  Family Members   Support Systems:  Family Members, Friends/Neighbors    Home has 2 stories  5 stairs to climb to get into front door, 13 stairs to climb to reach second floor  Location of bedroom/bathroom in home upper    Patient able to perform ADL's:Independent    Current Services (outpatient & in home) none  DME equipment: none  DME provider: none    Receiving oral anticoagulation therapy? No    If indicated:   Physician managing anticoagulation treatment: n/a  Where does patient obtain lab work for ATC treatment? n/a      Potential Assistance Needed:  N/A    Patient agreeable to home care: No  [place] of choice provided:  n/a    Prior SNF/Rehab Placement and Facility: none  Agreeable to SNF/Rehab: No  [place] of choice provided: n/a     Evaluation: no    Expected Discharge date:       Patient expects to be discharged to:  home  Follow Up Appointment: Best Day/ Time:      Transportation provider: family  Transportation arrangements needed for discharge: No    Readmission Risk              Risk of Unplanned Readmission:        6             Does patient have a readmission risk score greater than 14?: No  If yes, follow-up appointment must be made within 7 days of discharge. Goals of Care:  To go home      Discharge Plan: Home independently, list given for pcp, has transportation          Electronically signed by Willene Bence, RN on 21 at 10:13 AM EST

## 2021-01-20 NOTE — ED NOTES
Attempted to give Report to 4C. RN reports nurse taking pt is off the floor and will call back.       Josefa Curtis RN  01/20/21 9669

## 2021-01-20 NOTE — PROGRESS NOTES
not feeling any pain. Patient denies any history of diabetes mellitus or any other chronic medical conditions that is known of. He states that he is having some difficulty eating food because of the wound and some difficulty with the sleep. Denies any fever, chills, flulike symptoms, nausea ,vomiting, abdominal pain, urinary or bowel complaints . Patient denies any history of depression or psychiatric diagnosis. He states that he has just been feeling weak since this started.     Patient smokes about half pack a day, has been smoking for 20 years  He states that he drinks once in a week about 2 cans of beer. Patient denies any street drugs ever used.     In the ED , patient is comfortable, alert oriented, not in any distress, answering questions properly. Vitals stable, temp 99.3  Open wound clearly seen on left jaw, with no active drainage, nontender on palpation or poking. Infectious disease and plastic surgery have been consulted in ED. Patient has been started on doxycycline. Cultures has been sent from the wound. Tox positive for amphetamines and cannabinoids. OBJECTIVE     Vital Signs:  /82   Pulse 53   Temp 98.6 °F (37 °C) (Oral)   Resp 16   Ht 5' 11\" (1.803 m)   Wt 170 lb (77.1 kg)   SpO2 100%   BMI 23.71 kg/m²     Temp (24hrs), Av °F (37.2 °C), Min:98.6 °F (37 °C), Max:99.3 °F (37.4 °C)    No intake/output data recorded. PHYSICAL EXAMINATION:  Constitutional: This is a well developed, well nourished, 18.5-24.9 - Normal 47y.o. year old male who is alert, oriented, cooperative and in no apparent distress. Head:normocephalic and atraumatic. EENT:  PERRLA. No conjunctival injections. Septum was midline, mucosa was without erythema, exudates or cobblestoning. No thrush was noted. Neck: Supple without thyromegaly. No elevated JVP. Trachea was midline. Respiratory: Chest was symmetrical without dullness to percussion.   Breath sounds bilaterally were clear to auscultation. There were no wheezes, rhonchi or rales. There is no intercostal retraction or use of accessory muscles. No egophony noted. Cardiovascular: Regular without murmur, clicks, gallops or rubs. Abdomen: Slightly rounded and soft without organomegaly. No rebound, rigidity or guarding was appreciated. Lymphatic: No lymphadenopathy. Musculoskeletal: Normal curvature of the spine. No gross muscle weakness. Extremities:  No lower extremity edema, ulcerations, tenderness, varicosities or erythema. Muscle size, tone and strength are normal.  No involuntary movements are noted. Skin: Left jaw open wound, not tender on poking. No active drainage/bleeding from the wound.               Neurological/Psychiatric: The patient's general behavior, level of consciousness, thought content and emotional status is normal.           Medications:  Scheduled Medications:    sodium chloride flush  10 mL Intravenous 2 times per day    enoxaparin  40 mg Subcutaneous Daily    doxycycline hyclate  100 mg Oral 2 times per day     Continuous Infusions:   PRN Medications    sodium chloride flush, 10 mL, PRN      promethazine, 12.5 mg, Q6H PRN    Or      ondansetron, 4 mg, Q6H PRN      polyethylene glycol, 17 g, Daily PRN      acetaminophen, 650 mg, Q6H PRN    Or      acetaminophen, 650 mg, Q6H PRN      melatonin, 3 mg, Nightly PRN        Diagnostic Labs:  CBC:   Recent Labs     01/19/21  1901 01/20/21  0559   WBC 6.5 5.9   RBC 4.58 4.86   HGB 13.9 14.8   HCT 42.9 45.1   MCV 93.7 92.8   RDW 13.8 13.5    See Reflexed IPF Result     BMP:   Recent Labs     01/19/21  1901 01/20/21  0559    141   K 4.5 4.6   * 110*   CO2 22 22   BUN 11 10   CREATININE 0.87 0.94     BNP: No results for input(s): BNP in the last 72 hours. PT/INR: No results for input(s): PROTIME, INR in the last 72 hours. APTT: No results for input(s): APTT in the last 72 hours.   CARDIAC ENZYMES: No results for input(s): CKMB, Dolly Fragmin in the last 72 hours. Invalid input(s): CKTOTAL;3  FASTING LIPID PANEL:No results found for: CHOL, HDL, TRIG  LIVER PROFILE: No results for input(s): AST, ALT, ALB, BILIDIR, BILITOT, ALKPHOS in the last 72 hours. MICROBIOLOGY:   Lab Results   Component Value Date/Time    CULTURE PENDING 01/19/2021 07:28 PM       Imaging:    No results found. ASSESSMENT & PLAN     ASSESSMENT / PLAN:      Active Problems:     Open wound of skin, left jaw  Cultures sent from ED, pending  Patient started on doxycycline. ID and plastic surgery on board. HBa1c normal  Consulted wound care.     History of substance abuse   the patient.     DVT ppx: Lovenox  GI ppx: Not indicated     PT/OT/SW: Ongoing  Discharge Planning: Ongoing    Roseanna Kruse MD  Internal Medicine Resident, PGY-1  [facility];  [place], [place]  1/20/2021, 9:51 AM

## 2021-01-20 NOTE — ED NOTES
Pt provided warm blankets. NAD noted. Pt respirations are even and unlabored, pt is oriented X 4, speaking in complete sentences, bed is in the lowest position, call light is within reach. Will continue to monitor.        Rudolph Spence RN  01/19/21 1239

## 2021-01-20 NOTE — H&P
[address]     Department of Internal Medicine - Staff Internal Medicine Teaching Service          ADMISSION NOTE/HISTORY AND PHYSICAL EXAMINATION   Date: 1/19/2021  Patient Name: Jarrod Nelson  Date of admission: 1/19/2021  6:44 PM  YOB: 1980  PCP: No primary care provider on file. History Obtained From:  patient    CHIEF COMPLAINT     Chief complaint: self- induced open wound on left jaw    HISTORY OF PRESENTING ILLNESS     The patient is a pleasant 47 y.o. male presents with a chief complaint of wound on his left jaw. Patient presents as transfer from [facility] for a self-induced open wound on left jaw. Patient states that he has history of ingrown hair on his face and he normally just picks them out with tweezers. He states that this Friday he tried the same but felt that when he was trying to pull out hair, something worm-like came out with the tweezers and he and his girlfriend continue to dig it for past few days to get rid of these \"worms\". He describes these worms as white in color, one of them according to him was even moving. He does not have a picture of the same. He states that initially he had a discharge from the wound which he describes as yellow balls and later followed by a creamy pus. Patient states that he did not feel any pain and currently also even on poking in the wound he is not feeling any pain. Patient denies any history of diabetes mellitus or any other chronic medical conditions that is known of. He states that he is having some difficulty eating food because of the wound and some difficulty with the sleep. Denies any fever, chills, flulike symptoms, nausea ,vomiting, abdominal pain, urinary or bowel complaints . Patient denies any history of depression or psychiatric diagnosis. He states that he has just been feeling weak since this started.     Patient smokes about half pack a day, has been smoking for 20 years  He states that he drinks once in a week about 2 cans of beer. Patient denies any street drugs ever used. In the ED , patient is comfortable, alert oriented, not in any distress, answering questions properly. Vitals stable, temp 99.3  Open wound clearly seen on left jaw, with no active drainage, nontender on palpation or poking. Infectious disease and plastic surgery have been consulted in ED. Patient has been started on doxycycline. Cultures has been sent from the wound. Tox positive for amphetamines and cannabinoids. Review of Systems:  General ROS: Completed and except as mentioned above were negative   HEENT ROS: Completed and except as mentioned above were negative   Allergy and Immunology ROS:  Completed and except as mentioned above were negative  Hematological and Lymphatic ROS:  Completed and except as mentioned above were negative  Respiratory ROS:  Completed and except as mentioned above were negative  Cardiovascular ROS:  Completed and except as mentioned above were negative  Gastrointestinal ROS: Completed and except as mentioned above were negative  Genito-Urinary ROS:  Completed and except as mentioned above were negative  Musculoskeletal ROS:  Completed and except as mentioned above were negative  Neurological ROS:  Completed and except as mentioned above were negative  Skin & Dermatological ROS:  Completed and except as mentioned above were negative  Psychological ROS:  Completed and except as mentioned above were negative    PAST MEDICAL HISTORY     History reviewed. No pertinent past medical history. PAST SURGICAL HISTORY     History reviewed. No pertinent surgical history. ALLERGIES     Patient has no known allergies. MEDICATIONS PRIOR TO ADMISSION     Prior to Admission medications    Not on File       SOCIAL HISTORY     Tobacco: Half pack a day for 20 years  Alcohol: 2 Cans of beer a week  Illicits: Denies  Occupation: Did not ask    FAMILY HISTORY     History reviewed.  No pertinent family history. PHYSICAL EXAM     Vitals: BP (!) 144/88   Pulse 61   Temp 99.3 °F (37.4 °C) (Oral)   Resp 16   Ht 5' 11\" (1.803 m)   Wt 170 lb (77.1 kg)   SpO2 99%   BMI 23.71 kg/m²   Tmax: Temp (24hrs), Av.3 °F (37.4 °C), Min:99.3 °F (37.4 °C), Max:99.3 °F (37.4 °C)    Last Body weight:   Wt Readings from Last 3 Encounters:   21 170 lb (77.1 kg)   19 168 lb (76.2 kg)     Body Mass Index : Body mass index is 23.71 kg/m². PHYSICAL EXAMINATION:  Constitutional: This is a well developed, well nourished, 18.5-24.9 - Normal 47y.o. year old male who is alert, oriented, cooperative and in no apparent distress. Head:normocephalic and atraumatic. EENT:  PERRLA. No conjunctival injections. Septum was midline, mucosa was without erythema, exudates or cobblestoning. No thrush was noted. Neck: Supple without thyromegaly. No elevated JVP. Trachea was midline. Respiratory: Chest was symmetrical without dullness to percussion. Breath sounds bilaterally were clear to auscultation. There were no wheezes, rhonchi or rales. There is no intercostal retraction or use of accessory muscles. No egophony noted. Cardiovascular: Regular without murmur, clicks, gallops or rubs. Abdomen: Slightly rounded and soft without organomegaly. No rebound, rigidity or guarding was appreciated. Lymphatic: No lymphadenopathy. Musculoskeletal: Normal curvature of the spine. No gross muscle weakness. Extremities:  No lower extremity edema, ulcerations, tenderness, varicosities or erythema. Muscle size, tone and strength are normal.  No involuntary movements are noted. Skin: Left jaw open wound, not tender on poking. No active drainage/bleeding from the wound.             Neurological/Psychiatric: The patient's general behavior, level of consciousness, thought content and emotional status is normal.          INVESTIGATIONS     Laboratory Testing:     Recent Results (from the past 24 hour(s))   CBC Collection Time: 01/19/21  7:01 PM   Result Value Ref Range    WBC 6.5 3.5 - 11.3 k/uL    RBC 4.58 4.21 - 5.77 m/uL    Hemoglobin 13.9 13.0 - 17.0 g/dL    Hematocrit 42.9 40.7 - 50.3 %    MCV 93.7 82.6 - 102.9 fL    MCH 30.3 25.2 - 33.5 pg    MCHC 32.4 28.4 - 34.8 g/dL    RDW 13.8 11.8 - 14.4 %    Platelets 247 991 - 081 k/uL    MPV 10.6 8.1 - 13.5 fL    NRBC Automated 0.0 0.0 per 100 WBC   BASIC METABOLIC PANEL    Collection Time: 01/19/21  7:01 PM   Result Value Ref Range    Glucose 92 70 - 99 mg/dL    BUN 11 6 - 20 mg/dL    CREATININE 0.87 0.70 - 1.20 mg/dL    Bun/Cre Ratio NOT REPORTED 9 - 20    Calcium 8.5 (L) 8.6 - 10.4 mg/dL    Sodium 140 135 - 144 mmol/L    Potassium 4.5 3.7 - 5.3 mmol/L    Chloride 113 (H) 98 - 107 mmol/L    CO2 22 20 - 31 mmol/L    Anion Gap 5 (L) 9 - 17 mmol/L    GFR Non-African American >60 >60 mL/min    GFR African American >60 >60 mL/min    GFR Comment          GFR Staging NOT REPORTED    Culture, Anaerobic and Aerobic    Collection Time: 01/19/21  7:28 PM    Specimen: Neck   Result Value Ref Range    Specimen Description . NECK LEFT     Special Requests NOT REPORTED     Direct Exam FEW NEUTROPHILS (A)     Direct Exam NO BACTERIA SEEN     Culture PENDING    Urinalysis    Collection Time: 01/19/21  8:05 PM   Result Value Ref Range    Color, UA YELLOW YELLOW    Turbidity UA CLEAR CLEAR    Glucose, Ur NEGATIVE NEGATIVE    Bilirubin Urine NEGATIVE NEGATIVE    Ketones, Urine NEGATIVE NEGATIVE    Specific Gravity, UA 1.026 1.005 - 1.030    Urine Hgb NEGATIVE NEGATIVE    pH, UA 7.0 5.0 - 8.0    Protein, UA NEGATIVE NEGATIVE    Urobilinogen, Urine Normal Normal    Nitrite, Urine NEGATIVE NEGATIVE    Leukocyte Esterase, Urine NEGATIVE NEGATIVE    Urinalysis Comments       Microscopic exam not performed based on chemical results unless requested in original order.    Urine Drug Screen    Collection Time: 01/19/21  8:05 PM   Result Value Ref Range    Amphetamine Screen, Ur POSITIVE (A) NEGATIVE Barbiturate Screen, Ur NEGATIVE NEGATIVE    Benzodiazepine Screen, Urine NEGATIVE NEGATIVE    Cocaine Metabolite, Urine NEGATIVE NEGATIVE    Methadone Screen, Urine NEGATIVE NEGATIVE    Opiates, Urine NEGATIVE NEGATIVE    Phencyclidine, Urine NEGATIVE NEGATIVE    Propoxyphene, Urine NOT REPORTED NEGATIVE    Cannabinoid Scrn, Ur POSITIVE (A) NEGATIVE    Oxycodone Screen, Ur NEGATIVE NEGATIVE    Methamphetamine, Urine NOT REPORTED NEGATIVE    Tricyclic Antidepressants, Urine NOT REPORTED NEGATIVE    MDMA, Urine NOT REPORTED NEGATIVE    Buprenorphine Urine NOT REPORTED NEGATIVE    Test Information       Assay provides medical screening only. The absence of expected drug(s) and/or metabolite(s) may indicate diluted or adulterated urine, limitations of testing or timing of collection. Imaging:   No results found. ASSESSMENT & PLAN     ASSESSMENT / PLAN:     Active Problems:    Open wound of skin, left jaw  Cultures sent from ED, pending  Patient started on doxycycline. ID and plastic surgery on board. Follow-up with HbA1c. Consult wound care. History of substance abuse   the patient. DVT ppx: Lovenox  GI ppx: Not indicated    PT/OT/SW: Ongoing  Discharge Planning: Ongoing    David Kenny MD  Internal Medicine Resident, PGY-1  [facility];  [place], [place]  1/19/2021, 9:20 PM

## 2021-01-20 NOTE — ED NOTES
Report given to [facility] - KARIME BUSTAMANTE. All questions answered at this time.       Vivek Nation RN  01/20/21 0504

## 2021-01-20 NOTE — CONSULTS
Plastics Consult - Rene      Re: wound left cheek/neck. Patient states that he has history of ingrown hairs that he plucks with tweezers. He and his significant other were plucking and got out a \"worm\". He Googled it and felt he had round worms from dirty razor. He says the area feels \"dead\", no sensation so she kept picking until there was a cavity and no more \"worms\" seen. He didn't take a photo or save any \"worms\". He did soak the area with isopropyl alcohol. He then presented to [facility] ER and was transferred to [facility]. PMH:  Unremarkable. No heart or lung disease. PSH:  Surgical repair left small finger as child. NKA. EXAM:    Patient alert and oriented, NAD, denies pain. HEENT: NC, PERRL, EOMI, mucous membranes pink and moist. Deep wound left lower cheek and neck. No active bleeding. Cavity with glistening salmon colored surface. Dark, dry eschar inferiorly. Chest: breathing nonlabored. Abd: benign. Ext: no gross deformity noted. Imp:    Large wound left cheek/neck. Cultures pending. Plan: Will perform bedside punch biopsy for pathology. CT lower face/neck. NS damp dressing BID. Will follow. PROCEDURE:    Procedure discussed with patient, risks and benefits. Consent given for procedure. Left cheek/neck area prepped with Betadine swabs. 3mm punch biopsy obtained. Cauterized with silver nitrate stick. Specimen sent to lab in formalin. Tolerated well.

## 2021-01-21 VITALS
DIASTOLIC BLOOD PRESSURE: 76 MMHG | BODY MASS INDEX: 23.8 KG/M2 | HEIGHT: 71 IN | RESPIRATION RATE: 16 BRPM | HEART RATE: 67 BPM | SYSTOLIC BLOOD PRESSURE: 114 MMHG | OXYGEN SATURATION: 98 % | WEIGHT: 170 LBS | TEMPERATURE: 98.1 F

## 2021-01-21 LAB
ABSOLUTE EOS #: 0.15 K/UL (ref 0–0.44)
ABSOLUTE IMMATURE GRANULOCYTE: <0.03 K/UL (ref 0–0.3)
ABSOLUTE LYMPH #: 2.74 K/UL (ref 1.1–3.7)
ABSOLUTE MONO #: 0.86 K/UL (ref 0.1–1.2)
ANION GAP SERPL CALCULATED.3IONS-SCNC: 8 MMOL/L (ref 9–17)
BASOPHILS # BLD: 1 % (ref 0–2)
BASOPHILS ABSOLUTE: 0.06 K/UL (ref 0–0.2)
BUN BLDV-MCNC: 13 MG/DL (ref 6–20)
BUN/CREAT BLD: ABNORMAL (ref 9–20)
CALCIUM SERPL-MCNC: 8.4 MG/DL (ref 8.6–10.4)
CHLORIDE BLD-SCNC: 109 MMOL/L (ref 98–107)
CO2: 20 MMOL/L (ref 20–31)
CREAT SERPL-MCNC: 0.9 MG/DL (ref 0.7–1.2)
DIFFERENTIAL TYPE: ABNORMAL
EOSINOPHILS RELATIVE PERCENT: 3 % (ref 1–4)
GFR AFRICAN AMERICAN: >60 ML/MIN
GFR NON-AFRICAN AMERICAN: >60 ML/MIN
GFR SERPL CREATININE-BSD FRML MDRD: ABNORMAL ML/MIN/{1.73_M2}
GFR SERPL CREATININE-BSD FRML MDRD: ABNORMAL ML/MIN/{1.73_M2}
GLUCOSE BLD-MCNC: 91 MG/DL (ref 70–99)
HCT VFR BLD CALC: 44.9 % (ref 40.7–50.3)
HEMOGLOBIN: 14.6 G/DL (ref 13–17)
IMMATURE GRANULOCYTES: 0 %
LYMPHOCYTES # BLD: 45 % (ref 24–43)
MCH RBC QN AUTO: 30.4 PG (ref 25.2–33.5)
MCHC RBC AUTO-ENTMCNC: 32.5 G/DL (ref 28.4–34.8)
MCV RBC AUTO: 93.3 FL (ref 82.6–102.9)
MONOCYTES # BLD: 14 % (ref 3–12)
NRBC AUTOMATED: 0 PER 100 WBC
PDW BLD-RTO: 13.3 % (ref 11.8–14.4)
PLATELET # BLD: 225 K/UL (ref 138–453)
PLATELET ESTIMATE: ABNORMAL
PMV BLD AUTO: 10.2 FL (ref 8.1–13.5)
POTASSIUM SERPL-SCNC: 4.1 MMOL/L (ref 3.7–5.3)
RBC # BLD: 4.81 M/UL (ref 4.21–5.77)
RBC # BLD: ABNORMAL 10*6/UL
SEG NEUTROPHILS: 37 % (ref 36–65)
SEGMENTED NEUTROPHILS ABSOLUTE COUNT: 2.29 K/UL (ref 1.5–8.1)
SODIUM BLD-SCNC: 137 MMOL/L (ref 135–144)
WBC # BLD: 6.1 K/UL (ref 3.5–11.3)
WBC # BLD: ABNORMAL 10*3/UL

## 2021-01-21 PROCEDURE — 6370000000 HC RX 637 (ALT 250 FOR IP): Performed by: STUDENT IN AN ORGANIZED HEALTH CARE EDUCATION/TRAINING PROGRAM

## 2021-01-21 PROCEDURE — 36415 COLL VENOUS BLD VENIPUNCTURE: CPT

## 2021-01-21 PROCEDURE — 6360000002 HC RX W HCPCS: Performed by: STUDENT IN AN ORGANIZED HEALTH CARE EDUCATION/TRAINING PROGRAM

## 2021-01-21 PROCEDURE — 99232 SBSQ HOSP IP/OBS MODERATE 35: CPT | Performed by: INTERNAL MEDICINE

## 2021-01-21 PROCEDURE — 85025 COMPLETE CBC W/AUTO DIFF WBC: CPT

## 2021-01-21 PROCEDURE — 80048 BASIC METABOLIC PNL TOTAL CA: CPT

## 2021-01-21 PROCEDURE — 2580000003 HC RX 258: Performed by: STUDENT IN AN ORGANIZED HEALTH CARE EDUCATION/TRAINING PROGRAM

## 2021-01-21 RX ORDER — IBUPROFEN 400 MG/1
400 TABLET ORAL EVERY 6 HOURS PRN
Qty: 10 TABLET | Refills: 3 | Status: SHIPPED | OUTPATIENT
Start: 2021-01-21

## 2021-01-21 RX ORDER — OXYCODONE HYDROCHLORIDE AND ACETAMINOPHEN 5; 325 MG/1; MG/1
1 TABLET ORAL ONCE
Status: COMPLETED | OUTPATIENT
Start: 2021-01-21 | End: 2021-01-21

## 2021-01-21 RX ADMIN — AMOXICILLIN AND CLAVULANATE POTASSIUM 1 TABLET: 875; 125 TABLET, FILM COATED ORAL at 08:23

## 2021-01-21 RX ADMIN — OXYCODONE HYDROCHLORIDE AND ACETAMINOPHEN 1 TABLET: 5; 325 TABLET ORAL at 02:09

## 2021-01-21 RX ADMIN — DOXYCYCLINE HYCLATE 100 MG: 100 TABLET, COATED ORAL at 08:23

## 2021-01-21 RX ADMIN — OXYCODONE HYDROCHLORIDE AND ACETAMINOPHEN 1 TABLET: 5; 325 TABLET ORAL at 12:25

## 2021-01-21 RX ADMIN — SODIUM CHLORIDE, PRESERVATIVE FREE 10 ML: 5 INJECTION INTRAVENOUS at 08:24

## 2021-01-21 RX ADMIN — OXYCODONE HYDROCHLORIDE AND ACETAMINOPHEN 1 TABLET: 5; 325 TABLET ORAL at 04:45

## 2021-01-21 ASSESSMENT — PAIN DESCRIPTION - PAIN TYPE: TYPE: ACUTE PAIN

## 2021-01-21 ASSESSMENT — ENCOUNTER SYMPTOMS
COLOR CHANGE: 0
EYE DISCHARGE: 0
PHOTOPHOBIA: 0
ABDOMINAL DISTENTION: 0
SHORTNESS OF BREATH: 0
APNEA: 0
STRIDOR: 0
CHEST TIGHTNESS: 0
SORE THROAT: 0
COUGH: 0
CONSTIPATION: 0
NAUSEA: 0
VOMITING: 0
DIARRHEA: 0
EYE REDNESS: 0
RHINORRHEA: 0
WHEEZING: 0

## 2021-01-21 ASSESSMENT — PAIN SCALES - GENERAL
PAINLEVEL_OUTOF10: 1
PAINLEVEL_OUTOF10: 4
PAINLEVEL_OUTOF10: 1
PAINLEVEL_OUTOF10: 8

## 2021-01-21 ASSESSMENT — PAIN DESCRIPTION - LOCATION: LOCATION: JAW

## 2021-01-21 NOTE — PROGRESS NOTES
[facility]  Internal Medicine Teaching Residency Program  Inpatient Daily Progress Note  ______________________________________________________________________________    Patient: Shannan Chacon  YOB: 1980   HEK:[id number]    Acct: [de-identified]     Room: [facility]5471-  Admit date: 1/19/2021  Today's date: 01/21/21  Number of days in the hospital: 2    SUBJECTIVE   Admitting Diagnosis: <principal problem not specified>  CC: open wound left jaw  Pt examined at bedside. Chart & results reviewed. Vitals stable, afebrile. Underwent punch biopsy yesterday by plastic surgery. CT scan   Soft tissue defect in the left lower cheek and submandibular region extending   towards the left neck inferiorly.  Inferior to this region there is a small   amount of skin thickening with subcutaneous fluid and air.  This is of   uncertain etiology and may relate to an infectious etiology although   correlation is needed.       Prominent submental lymph node that may be reactive.         Seen by ID, on Doxycycline and Augmentin. HIV non reactive. ROS:  Constitutional:  negative for chills, fevers, sweats  Respiratory:  negative for cough, dyspnea on exertion, hemoptysis, shortness of breath, wheezing  Cardiovascular:  negative for chest pain, chest pressure/discomfort, lower extremity edema, palpitations  Gastrointestinal:  negative for abdominal pain, constipation, diarrhea, nausea, vomiting  Neurological:  negative for dizziness, headache  BRIEF HISTORY     The patient is a pleasant 47 y.o. male presents with a chief complaint of wound on his left jaw. Patient presents as transfer from [facility] for a self-induced open wound on left jaw. Patient states that he has history of ingrown hair on his face and he normally just picks them out with tweezers.   He states that this Friday he tried the same but felt that when he was trying to pull out hair, something worm-like came out with y.o. year old male who is alert, oriented, cooperative and in no apparent distress. Head:normocephalic and atraumatic. EENT:  PERRLA. No conjunctival injections. Septum was midline, mucosa was without erythema, exudates or cobblestoning. No thrush was noted. Neck: Supple without thyromegaly. No elevated JVP. Trachea was midline. Respiratory: Chest was symmetrical without dullness to percussion. Breath sounds bilaterally were clear to auscultation. There were no wheezes, rhonchi or rales. There is no intercostal retraction or use of accessory muscles. No egophony noted. Cardiovascular: Regular without murmur, clicks, gallops or rubs. Abdomen: Slightly rounded and soft without organomegaly. No rebound, rigidity or guarding was appreciated. Lymphatic: No lymphadenopathy. Musculoskeletal: Normal curvature of the spine. No gross muscle weakness. Extremities:  No lower extremity edema, ulcerations, tenderness, varicosities or erythema. Muscle size, tone and strength are normal.  No involuntary movements are noted. Skin: Left jaw open wound, not tender on poking.   No active drainage/bleeding from the wound.               Neurological/Psychiatric: The patient's general behavior, level of consciousness, thought content and emotional status is normal.           Medications:  Scheduled Medications:    sodium chloride flush  10 mL Intravenous 2 times per day    enoxaparin  40 mg Subcutaneous Daily    amoxicillin-clavulanate  1 tablet Oral 2 times per day    doxycycline hyclate  100 mg Oral 2 times per day     Continuous Infusions:   PRN Medications    sodium chloride flush, 10 mL, PRN      promethazine, 12.5 mg, Q6H PRN    Or      ondansetron, 4 mg, Q6H PRN      polyethylene glycol, 17 g, Daily PRN      acetaminophen, 650 mg, Q6H PRN    Or      acetaminophen, 650 mg, Q6H PRN      melatonin, 3 mg, Nightly PRN      ibuprofen, 400 mg, Q6H PRN        Diagnostic Labs:  CBC:   Recent Labs 01/19/21  1901 01/20/21  0559 01/21/21  0624   WBC 6.5 5.9 6.1   RBC 4.58 4.86 4.81   HGB 13.9 14.8 14.6   HCT 42.9 45.1 44.9   MCV 93.7 92.8 93.3   RDW 13.8 13.5 13.3    See Reflexed IPF Result 225     BMP:   Recent Labs     01/19/21  1901 01/20/21  0559 01/21/21  0624    141 137   K 4.5 4.6 4.1   * 110* 109*   CO2 22 22 20   BUN 11 10 13   CREATININE 0.87 0.94 0.90     BNP: No results for input(s): BNP in the last 72 hours. PT/INR: No results for input(s): PROTIME, INR in the last 72 hours. APTT: No results for input(s): APTT in the last 72 hours. CARDIAC ENZYMES: No results for input(s): CKMB, CKMBINDEX, TROPONINI in the last 72 hours. Invalid input(s): CKTOTAL;3  FASTING LIPID PANEL:No results found for: CHOL, HDL, TRIG  LIVER PROFILE: No results for input(s): AST, ALT, ALB, BILIDIR, BILITOT, ALKPHOS in the last 72 hours. MICROBIOLOGY:   Lab Results   Component Value Date/Time    CULTURE NORMAL ROXANN 01/19/2021 07:28 PM       Imaging:    No results found. ASSESSMENT & PLAN     ASSESSMENT / PLAN:      Active Problems:     Open wound of skin, left jaw  Cultures sent from ED, pending  Punch biopsy by plastic surgery yesterday. Patient started on doxycycline and Augmentin by ID. HBa1c normal.   CT scan shows:  Soft tissue defect in the left lower cheek and submandibular region extending   towards the left neck inferiorly.  Inferior to this region there is a small   amount of skin thickening with subcutaneous fluid and air.  This is of   uncertain etiology and may relate to an infectious etiology although   correlation is needed.       Prominent submental lymph node that may be reactive.           History of substance abuse   the patient.     DVT ppx: Lovenox  GI ppx: Not indicated     PT/OT/SW: Ongoing  Discharge Planning: Ongoing    Liliam Monsivais MD  Internal Medicine Resident, PGY-1  [address], [place]  1/21/2021, 12:06 PM

## 2021-01-21 NOTE — PROGRESS NOTES
Comprehensive Nutrition Assessment    Type and Reason for Visit:  Initial, Positive Nutrition Screen, Wound    Nutrition Recommendations/Plan: Send Ensure Enlive BID. Obtain current actual weight as able. Nutrition Assessment:  Pt sleeping x 2 attempted visits. Noted large open wound on L cheek/jaw. Malnutrition Assessment:  Malnutrition Status:  Insufficient data      Estimated Daily Nutrient Needs:  Energy (kcal):  30-32 kcal/kg = 6982-0901 kcals/day; Weight Used for Energy Requirements:  Ideal     Protein (g):  1.2-1.5 gm/kg =  gm/day; Weight Used for Protein Requirements:  Ideal          Nutrition Related Findings:  Meds/labs reviewed      Wounds:  Open Wounds(L cheek/jaw)       Current Nutrition Therapies:    DIET GENERAL; Anthropometric Measures:  · Height: 5' 11\" (180.3 cm)  · Current Body Weight: 170 lb (77.1 kg)   · Ideal Body Weight: 172 lbs; % Ideal Body Weight 98.8 %   · BMI: 23.7  · BMI Categories: Normal Weight (BMI 18.5-24. 9)       Nutrition Diagnosis:   · Increased nutrient needs related to (healing) as evidenced by (Large open wound)      Nutrition Interventions:   Food and/or Nutrient Delivery:  Continue Current Diet, Start Oral Nutrition Supplement  Nutrition Education/Counseling:  No recommendation at this time   Coordination of Nutrition Care:  Continue to monitor while inpatient    Goals:  PO intake to meet greater than 50% of estimated nutrient needs       Nutrition Monitoring and Evaluation:   Behavioral-Environmental Outcomes:  None Identified   Food/Nutrient Intake Outcomes:  Food and Nutrient Intake, Supplement Intake  Physical Signs/Symptoms Outcomes:  Weight, Skin, Biochemical Data, Nutrition Focused Physical Findings     Discharge Planning:     Too soon to determine     Electronically signed by Dominik Whitley, MS, RD, LD on 1/21/21 at 11:55 AM EST    Contact: [phone]

## 2021-01-21 NOTE — PLAN OF CARE
Problem: Pain:  Goal: Pain level will decrease  Description: Pain level will decrease  1/21/2021 1121 by Fozia Bunn RN  Outcome: Ongoing  1/21/2021 0607 by Wily Giordano RN  Outcome: Ongoing  Goal: Control of acute pain  Description: Control of acute pain  1/21/2021 1121 by Fozia Bunn RN  Outcome: Ongoing  1/21/2021 0607 by Wily Giordano RN  Outcome: Ongoing  Goal: Control of chronic pain  Description: Control of chronic pain  1/21/2021 1121 by Fozia Bunn RN  Outcome: Ongoing  1/21/2021 0607 by Wily Giordano RN  Outcome: Ongoing     Problem: Skin Integrity:  Goal: Demonstration of wound healing without infection will improve  Description: Demonstration of wound healing without infection will improve  1/21/2021 1121 by Fozia Bunn RN  Outcome: Ongoing  1/21/2021 0607 by Wily Giordano RN  Outcome: Ongoing

## 2021-01-21 NOTE — PROGRESS NOTES
[facility] -   Infectious diseases evaluation  admission date 1/19/2021    reason for consultation:   wound    Impression :   Current:  · Facial infected wound    Recommendations     · Discharged today   · No Antibiotics on Discharge  · Outpatient ID follow up    Thank you for allowing us to participate in care of this patient. Please do not hesitate to contact us with any questions or concerns. Infection Control Recommendations   · [place] Precautions    Antimicrobial Stewardship Recommendations   · Simplification of therapy  · Targeted therapy      Coordination ofOutpatient Care:   · Estimated Length of IV antimicrobials:  · Patient will need Midline / picc Catheter Insertion:   · Patient will need SNF:  · Patient will need outpatient wound care:     History of Present Illness:   Initial history:  Trav Espana is a 47y.o.-year-old male   Presents with swelling of the left neck by the jaw, along with a deep wound in that area. He was transferred from [facility], initially had a ingrown hair in that area but he was picking on it with tweezers on a regular basis thinking there are parasites inside. Ultimately developed this deep wound. His urine tested positive for cannabinoids and amphetamine.   Denies any fever abdominal pain diarrhea  On exam the wound is deep, it has some drainage, but does not have any cellulitis surrounding it    Patient admitted, started on doxycycline  Plastic consulted  Culture taken    WBC, creatinine normal        Interval changes  1/21/2021   Patient Vitals for the past 8 hrs:   BP Temp Temp src Pulse Resp SpO2 Height   01/21/21 1150 -- -- -- -- -- -- 5' 11\" (1.803 m)   01/21/21 1129 114/76 98.1 °F (36.7 °C) Oral 67 16 98 % --     No acute episodes overnigiht  Patient is heme stable and febrile  Denies any psychosis or manic concerns   Denies any use of amphetamine although positive on urine toxicology  Wound care following   TSH and Free T4 Normal    Summary of relevant labs:  Labs:  WBC 5.9 - 6.1  Hb 14.8 - 14.6  CRP 13.7   Cr 0.94 - 0.90  Urine toxicology positive for Marijuana and Amphetamines    Micro:  Anaerobic/Aerobic culture showing few neutrophils  Culture and sensitivity pending     Imaging:  CT Soft Tissue Neck   Soft tissue defect in the left lower cheek and submandibular region extending towards the left neck inferiorly. Inferior to this region there is a small amount of skin thickening with subcutaneous fluid and air. This is of uncertain etiology and may relate to an infectious etiology although correlation is needed. Prominent submental lymph node that may be reactive. I have personally reviewed the past medical history, past surgical history, medications, social history, and family history, and I haveupdated the database accordingly. Allergies:   Patient has no known allergies. Review of Systems:     Review of Systems   Constitutional: Negative for activity change, appetite change, diaphoresis, fatigue and fever. HENT: Negative for congestion, rhinorrhea, sneezing, sore throat and tinnitus. Eyes: Negative for photophobia, discharge, redness and visual disturbance. Respiratory: Negative for apnea, cough, chest tightness, shortness of breath, wheezing and stridor. Cardiovascular: Negative for chest pain, palpitations and leg swelling. Gastrointestinal: Negative for abdominal distention, constipation, diarrhea, nausea and vomiting. Endocrine: Negative for cold intolerance, polydipsia, polyphagia and polyuria. Genitourinary: Negative for dysuria, flank pain, hematuria, testicular pain and urgency. Musculoskeletal: Negative for arthralgias, myalgias, neck pain and neck stiffness. Skin: Positive for wound. Negative for color change and pallor. Allergic/Immunologic: Negative for immunocompromised state.    Neurological: Negative for dizziness, tremors, syncope, weakness, light-headedness and numbness. Hematological: Negative for adenopathy. Psychiatric/Behavioral: Negative for agitation, confusion, hallucinations, self-injury and sleep disturbance. Physical Examination :     Physical Exam  Constitutional:       Appearance: Normal appearance. He is not ill-appearing. HENT:      Head: Normocephalic and atraumatic. Nose: Nose normal. No congestion or rhinorrhea. Mouth/Throat:      Mouth: Mucous membranes are moist.      Pharynx: No oropharyngeal exudate or posterior oropharyngeal erythema. Eyes:      General: No scleral icterus. Pupils: Pupils are equal, round, and reactive to light. Neck:      Musculoskeletal: Neck supple. No neck rigidity. Vascular: No carotid bruit. Cardiovascular:      Rate and Rhythm: Normal rate and regular rhythm. Heart sounds: No murmur. No friction rub. No gallop. Pulmonary:      Effort: Pulmonary effort is normal. No respiratory distress. Breath sounds: Normal breath sounds. No stridor. No wheezing, rhonchi or rales. Chest:      Chest wall: No tenderness. Abdominal:      General: There is no distension. Palpations: Abdomen is soft. There is no mass. Tenderness: There is no abdominal tenderness. There is no guarding or rebound. Hernia: No hernia is present. Genitourinary:     Comments: No marsh  Musculoskeletal:         General: No swelling, tenderness, deformity or signs of injury. Lymphadenopathy:      Cervical: No cervical adenopathy. Skin:     General: Skin is dry. Coloration: Skin is not jaundiced or pale. Findings: No bruising, erythema, lesion or rash. Neurological:      General: No focal deficit present. Mental Status: He is alert and oriented to person, place, and time. Psychiatric:         Mood and Affect: Mood normal.         Behavior: Behavior normal.         Thought Content:  Thought content normal.         Judgment: Judgment normal.       Past Medical History:   History reviewed. No pertinent past medical history. Past Surgical  History:   History reviewed. No pertinent surgical history. Medications:      sodium chloride flush  10 mL Intravenous 2 times per day    enoxaparin  40 mg Subcutaneous Daily    amoxicillin-clavulanate  1 tablet Oral 2 times per day    doxycycline hyclate  100 mg Oral 2 times per day     Social History:     Social History     Socioeconomic History    Marital status: Single     Spouse name: Not on file    Number of children: Not on file    Years of education: Not on file    Highest education level: Not on file   Occupational History    Not on file   Social Needs    Financial resource strain: Not on file    Food insecurity     Worry: Not on file     Inability: Not on file    Transportation needs     Medical: Not on file     Non-medical: Not on file   Tobacco Use    Smoking status: Current Every Day Smoker     Packs/day: 0.50    Smokeless tobacco: Never Used   Substance and Sexual Activity    Alcohol use: Yes     Comment: Socially    Drug use: Never    Sexual activity: Not on file   Lifestyle    Physical activity     Days per week: Not on file     Minutes per session: Not on file    Stress: Not on file   Relationships    Social connections     Talks on phone: Not on file     Gets together: Not on file     Attends [employer] service: Not on file     Active member of club or organization: Not on file     Attends meetings of clubs or organizations: Not on file     Relationship status: Not on file    Intimate partner violence     Fear of current or ex partner: Not on file     Emotionally abused: Not on file     Physically abused: Not on file     Forced sexual activity: Not on file   Other Topics Concern    Not on file   Social History Narrative    Not on file     Family History:   History reviewed. No pertinent family history.    Medical Decision Making:   I have independently reviewed/ordered the following labs:    CBC with Differential:   Recent Labs     01/20/21  0559 01/21/21  0624   WBC 5.9 6.1   HGB 14.8 14.6   HCT 45.1 44.9   PLT See Reflexed IPF Result 225   LYMPHOPCT 43 45*   MONOPCT 12 14*     BMP:  Recent Labs     01/20/21  0559 01/21/21  0624    137   K 4.6 4.1   * 109*   CO2 22 20   BUN 10 13   CREATININE 0.94 0.90     Lab Results   Component Value Date    CREATININE 0.90 01/21/2021    GLUCOSE 91 01/21/2021     Thank you for allowing us to participate in the care of this patient. Please call with questions. This note is created with the assistance of a speech recognition program.  While intending to generate adocument that actually reflects the content of the visit, the document can still have some errors including those of syntax and sound a like substitutions which may escape proof reading. It such instances, actual meaningcan be extrapolated by contextual diversion. Jose Ramon Bermeo MD  PGY-1, Internal Medicine Resident  [address] [facility]  1/21/2021 4:55 PM           I have discussed the care of the patient, including pertinent history and exam findings,  with the resident. I have seen and examined the patient and the key elements of all parts of the encounter have been performed by me. I agree with the assessment, plan and orders as documented by the resident.     Marci Staton, Infectious Diseases

## 2021-01-21 NOTE — PLAN OF CARE
Problem: Pain:  Goal: Pain level will decrease  Description: Pain level will decrease  Outcome: Ongoing  Goal: Control of acute pain  Description: Control of acute pain  Outcome: Ongoing  Goal: Control of chronic pain  Description: Control of chronic pain  Outcome: Ongoing     Problem: Skin Integrity:  Goal: Demonstration of wound healing without infection will improve  Description: Demonstration of wound healing without infection will improve  Outcome: Ongoing

## 2021-01-21 NOTE — CARE COORDINATION
Discharge order noted. Call to Lorenzo Hopkins with [employer] to inquire about financial assistance. Pt does not qualify for Medicaid as his income is too high. Met with pt at bedside to discuss transitional planning. Notified him that he would not be able to receive home care. He stated his girlfriend is able to assist with dressing changes. Spoke with Sylwia RN, who stated she will show girlfriend how to complete dressing change. Pt is to follow up at wound care clinic.  Pt is agreeable with plan and denies other needs    Discharge Report    [facility]  Written by: Maru Roman RN    Patient Name: Rafa Ortega  Attending Provider: Kathe Mcpherson MD  Admit Date: 2021  6:44 PM  MRN: [id number]  Account: [de-identified]                     : 1980  Discharge Date: 2021      Disposition: home independently    Maru Roman RN

## 2021-01-21 NOTE — PLAN OF CARE
Nutrition Problem #1: Increased nutrient needs  Intervention: Food and/or Nutrient Delivery: Continue Current Diet, Start Oral Nutrition Supplement  Nutritional Goals: PO intake to meet greater than 50% of estimated nutrient needs

## 2021-01-22 LAB — DERMATOLOGY PATHOLOGY REPORT: NORMAL

## 2021-01-23 NOTE — DISCHARGE SUMMARY
jaw, with no active drainage, nontender on palpation or poking. Infectious disease and plastic surgery were consulted. Wound culture sent from ED. Tox positive for amphetamines and cannabinoids. Punch biopsy done by plastic surgery. Patient was started on antibiotics while in hospital but discharging with no antibiotics at home. CT scan shows:  Soft tissue defect in the left lower cheek and submandibular region extending   towards the left neck inferiorly.  Inferior to this region there is a small   amount of skin thickening with subcutaneous fluid and air.  This is of   uncertain etiology and may relate to an infectious etiology although   correlation is needed.       Prominent submental lymph node that may be reactive. Patient is stable for discharge today ,further work-up as per plastic surgery outpatient. Patient is to follow-up with wound care for daily dressings. Will follow-up with infectious disease outpatient.     Procedures/ Significant Interventions:    Punch biopsy    Consults:     Consults:     Final Specialist Recommendations/Findings:   IP CONSULT TO PLASTIC SURGERY  IP CONSULT TO INTERNAL MEDICINE  IP CONSULT TO INFECTIOUS DISEASES  IP CONSULT TO CASE MANAGEMENT      Any Hospital Acquired Infections: none    Discharge Functional Status:  stable    DISCHARGE PLAN     Disposition: home    Patient Instructions:   Discharge Medication List as of 1/21/2021  2:23 PM      START taking these medications    Details   ibuprofen (ADVIL;MOTRIN) 400 MG tablet Take 1 tablet by mouth every 6 hours as needed for Pain, Disp-10 tablet, R-3Normal             Activity: activity as tolerated    Diet: regular diet    Follow-up:    Jose Barnes MD  [address] [id number] [phone]    In 1 week      Kaia Knapp MD  [address] [place] [address], [address] [place] [address]    Schedule an appointment as soon as possible for a visit in 2 weeks  infec diseases    [facility] WOUND CARE  [address] [id number] [phone]  In 1 day        Patient Instructions: Need to follow up with wound care clinic  Follow up labs: none  Follow up imaging: none    Note that over 30 minutes was spent in preparing discharge papers, discussing discharge with patient, medication review, etc.      Ellouise Ormond, MD,   Internal Medicine Resident, PGY-1  [facility];  [place], [place]  1/23/2021, 11:04 AM

## 2021-01-24 LAB
CULTURE: ABNORMAL
CULTURE: ABNORMAL
DIRECT EXAM: ABNORMAL
DIRECT EXAM: ABNORMAL
Lab: ABNORMAL
SPECIMEN DESCRIPTION: ABNORMAL

## 2021-12-14 VITALS
BODY MASS INDEX: 25.48 KG/M2 | OXYGEN SATURATION: 99 % | TEMPERATURE: 97.9 F | DIASTOLIC BLOOD PRESSURE: 76 MMHG | WEIGHT: 182 LBS | RESPIRATION RATE: 18 BRPM | HEART RATE: 74 BPM | SYSTOLIC BLOOD PRESSURE: 124 MMHG | HEIGHT: 71 IN

## 2021-12-14 PROCEDURE — 99283 EMERGENCY DEPT VISIT LOW MDM: CPT

## 2021-12-14 ASSESSMENT — PAIN SCALES - GENERAL: PAINLEVEL_OUTOF10: 2

## 2021-12-14 ASSESSMENT — PAIN DESCRIPTION - LOCATION: LOCATION: FACE

## 2021-12-15 ENCOUNTER — HOSPITAL ENCOUNTER (EMERGENCY)
Age: 41
Discharge: HOME | End: 2021-12-15
Attending: EMERGENCY MEDICINE

## 2021-12-15 DIAGNOSIS — L08.9 SOFT TISSUE INFECTION: Primary | ICD-10-CM

## 2021-12-15 RX ORDER — CEPHALEXIN 500 MG/1
500 CAPSULE ORAL 4 TIMES DAILY
Qty: 20 CAPSULE | Refills: 0 | Status: SHIPPED | OUTPATIENT
Start: 2021-12-15 | End: 2021-12-20

## 2021-12-15 NOTE — ED PROVIDER NOTES
[address] [facility]     Emergency Department     Faculty Attestation    I performed a history and physical examination of the patient and discussed management with the resident. I have reviewed and agree with the residents findings including all diagnostic interpretations, and treatment plans as written. Any areas of disagreement are noted on the chart. I was personally present for the key portions of any procedures. I have documented in the chart those procedures where I was not present during the key portions. I have reviewed the emergency nurses triage note. I agree with the chief complaint, past medical history, past surgical history, allergies, medications, social and family history as documented unless otherwise noted below. Documentation of the HPI, Physical Exam and Medical Decision Making performed by scribmaxx is based on my personal performance of the HPI, PE and MDM. For Physician Assistant/ Nurse Practitioner cases/documentation I have personally evaluated this patient and have completed at least one if not all key elements of the E/M (history, physical exam, and MDM). Additional findings are as noted. 45 yo M c/o L facial swelling, \"opened sore\" no fever, no injury, no vomit  pe vss speech clear, 1 cm abraded open abscess, no fluctuance, nothing to I/d, neck supple with full rom,     Non toxic, appears quite well  Vss, treating as possible infectious process    EKG Interpretation    Interpreted by me      CRITICAL CARE: There was a high probability of clinically significant/life threatening deterioration in this patient's condition which required my urgent intervention. Total critical care time was 0 minutes. This excludes any time for separately reportable procedures.        Tete 24, DO  12/15/21 Jessie 197, DO  12/15/21 8523

## 2021-12-15 NOTE — ED NOTES
Pt to ed with c/o neck pain. Pt states hx of left sided neck infection approx 1 year ago and states this is simuliar. Pt states a \"bump\" was popped on face and \"white stuff came out. \" Pt states he removed \"specimen\" but discarded it.  Pt is a/o, ambulatory, RR even and non labored      [facility]  12/15/21 4867

## 2021-12-15 NOTE — ED PROVIDER NOTES
[facility] ED  Emergency Department Encounter  Emergency Medicine Resident     Pt Name: Nabor Velasquez  MRN: [id number]  [place] 1980  Date of evaluation: 12/15/21  PCP:  No primary care provider on file. CHIEF COMPLAINT       Chief Complaint   Patient presents with    Facial Swelling     3 x days ago       HISTORY OFPRESENT ILLNESS  (Location/Symptom, Timing/Onset, Context/Setting, Quality, Duration, Modifying Bry Door.)      Nabor Velasquez is a 46 y.o. male who presents with concern that he has parasites from a wound on the left mandibular region. Patient said that he had some swelling over the area so he cut the wound open and some \"white stuff \"leaked out. \"  Last year, patient had very similar concern of parasites in his face and he had a large self-inflicted wound to the left mandibular region, requiring plastic surgery evaluation, biopsy and IV antibiotics. Was found to have been positive for amphetamines at that time, no evidence of parasites were found. Patient says he was doing well until last night when he noticed a bit of swelling in the area and concerned that the \"parasites were back. \"  No fevers, chills, chest pain, shortness of breath, nausea, vomiting. No history of immunocompromise state. PAST MEDICAL / SURGICAL / SOCIAL / FAMILY HISTORY      has no past medical history on file. has no past surgical history on file. Social:  reports that he has been smoking. He has been smoking about 0.50 packs per day. He has never used smokeless tobacco. He reports current alcohol use. He reports that he does not use drugs. Family Hx: No family history on file. Allergies:  Patient has no known allergies. Home Medications:  Prior to Admission medications    Medication Sig Start Date End Date Taking?  Authorizing Provider   cephALEXin (KEFLEX) 500 MG capsule Take 1 capsule by mouth 4 times daily for 5 days 12/15/21 12/20/21 Yes Teddy Whitley MD   ibuprofen (ADVIL;MOTRIN) 400 MG tablet Take 1 tablet by mouth every 6 hours as needed for Pain 1/21/21   Mara Serra MD       REVIEW OFSYSTEMS    (2-9 systems for level 4, 10 or more for level 5)      Review of Systems   Constitutional: Negative for appetite change, chills, fatigue and fever. HENT: Positive for facial swelling (Facial sore). Negative for congestion, rhinorrhea, sneezing and sore throat. Eyes: Negative for visual disturbance. Respiratory: Negative for cough and shortness of breath. Cardiovascular: Negative for chest pain and leg swelling. Gastrointestinal: Negative for abdominal pain, diarrhea, nausea and vomiting. Genitourinary: Negative for dysuria. Musculoskeletal: Negative for myalgias, neck pain and neck stiffness. Skin: Positive for wound. Negative for rash. Neurological: Negative for dizziness, syncope, light-headedness and headaches. Psychiatric/Behavioral: Negative for dysphoric mood and suicidal ideas. PHYSICAL EXAM   (up to 7 for level 4, 8 or more forlevel 5)      INITIAL VITALS:   Vitals:    12/14/21 2200   BP: 124/76   Pulse: 74   Resp: 18   Temp: 97.9 °F (36.6 °C)   SpO2: 99%        Physical Exam  Vitals and nursing note reviewed. Constitutional:       General: He is not in acute distress. Appearance: Normal appearance. He is normal weight. He is not ill-appearing, toxic-appearing or diaphoretic. HENT:      Nose: Nose normal.      Mouth/Throat:      Mouth: Mucous membranes are moist.      Pharynx: Oropharynx is clear. Eyes:      Extraocular Movements: Extraocular movements intact. Conjunctiva/sclera: Conjunctivae normal.      Pupils: Pupils are equal, round, and reactive to light. Cardiovascular:      Rate and Rhythm: Normal rate and regular rhythm. Pulses: Normal pulses. Heart sounds: Normal heart sounds. Pulmonary:      Effort: Pulmonary effort is normal.      Breath sounds: Normal breath sounds.    Abdominal:      General: There is no distension. Palpations: Abdomen is soft. Tenderness: There is no abdominal tenderness. There is no right CVA tenderness, left CVA tenderness or guarding. Musculoskeletal:         General: Normal range of motion. Cervical back: Normal range of motion and neck supple. Skin:     General: Skin is warm. Capillary Refill: Capillary refill takes less than 2 seconds. Comments: 1 cm open wound to the left mandibular region. No overlying erythema, edema or drainage. No parasites visualized   Neurological:      General: No focal deficit present. Mental Status: He is alert and oriented to person, place, and time. Psychiatric:         Attention and Perception: Attention and perception normal.         Mood and Affect: Mood is elated. Speech: Speech normal.         Behavior: Behavior is hyperactive. Thought Content: Thought content is delusional. Thought content does not include suicidal ideation. Thought content does not include suicidal plan. DIFFERENTIAL  DIAGNOSIS     Initial MDM/Plan: 46 y.o. male who presents with concern that he has parasites and a wound in his face. Patient had a previous concern last year, was found to have used amphetamines, had a large self-inflicted wound to \"clear out the parasites. \"  Today, he has a small wound to the left mandibular region that is nonerythematous, not fluctuant, nontender to palpation. Vital signs are within normal limits. Patient is acting bizarre but denies suicidal or homicidal ideation. I suspect the patient is having tactile hallucinations secondary to amphetamine use and delusions of parasites from drug use, however since he has an open wound, concern for possible secondary infection development. Will give patient Keflex and discharge.     DIAGNOSTIC RESULTS / EMERGENCYDEPARTMENT COURSE / MDM     LABS:  Labs Reviewed - No data to display      RADIOLOGY:  No results found.    PROCEDURES:  None    CONSULTS:  None      FINAL IMPRESSION      1.  Soft tissue infection          DISPOSITION / PLAN     DISPOSITION Decision To Discharge 12/15/2021 02:32:53 AM      PATIENT REFERRED TO:  [facility]  [address] [facility] [address]  [phone]  Go to   If symptoms worsen    [address] [address] [phone]  Schedule an appointment as soon as possible for a visit         DISCHARGE MEDICATIONS:  Discharge Medication List as of 12/15/2021  2:34 AM      START taking these medications    Details   cephALEXin (KEFLEX) 500 MG capsule Take 1 capsule by mouth 4 times daily for 5 days, Disp-20 capsule, R-0Print             Jacques Ornelas MD  Emergency Medicine Resident    (Please note that portions of this note were completed with a voice recognition program.Efforts were made to edit the dictations but occasionally words are mis-transcribed.)        Jacques Ornelas MD  Resident  12/16/21 4798

## 2021-12-15 NOTE — ED NOTES
[facility]  12/15/21 1517

## 2021-12-16 ASSESSMENT — ENCOUNTER SYMPTOMS
DIARRHEA: 0
NAUSEA: 0
RHINORRHEA: 0
SHORTNESS OF BREATH: 0
ABDOMINAL PAIN: 0
SORE THROAT: 0
VOMITING: 0
FACIAL SWELLING: 1
COUGH: 0